# Patient Record
Sex: FEMALE | Race: WHITE | URBAN - METROPOLITAN AREA
[De-identification: names, ages, dates, MRNs, and addresses within clinical notes are randomized per-mention and may not be internally consistent; named-entity substitution may affect disease eponyms.]

---

## 2024-07-01 ENCOUNTER — TELEPHONE (OUTPATIENT)
Dept: SURGERY | Facility: CLINIC | Age: 54
End: 2024-07-01

## 2024-12-07 ENCOUNTER — APPOINTMENT (EMERGENCY)
Dept: CT IMAGING | Facility: HOSPITAL | Age: 54
End: 2024-12-07
Payer: COMMERCIAL

## 2024-12-07 ENCOUNTER — HOSPITAL ENCOUNTER (EMERGENCY)
Facility: HOSPITAL | Age: 54
End: 2024-12-08
Attending: EMERGENCY MEDICINE
Payer: COMMERCIAL

## 2024-12-07 VITALS
SYSTOLIC BLOOD PRESSURE: 171 MMHG | WEIGHT: 293 LBS | RESPIRATION RATE: 18 BRPM | OXYGEN SATURATION: 96 % | DIASTOLIC BLOOD PRESSURE: 74 MMHG | TEMPERATURE: 98.5 F | HEART RATE: 72 BPM

## 2024-12-07 DIAGNOSIS — M54.50 ACUTE LOW BACK PAIN: Primary | ICD-10-CM

## 2024-12-07 DIAGNOSIS — M48.061 SPINAL STENOSIS AT L4-L5 LEVEL: ICD-10-CM

## 2024-12-07 PROCEDURE — 99285 EMERGENCY DEPT VISIT HI MDM: CPT | Performed by: EMERGENCY MEDICINE

## 2024-12-07 PROCEDURE — 96372 THER/PROPH/DIAG INJ SC/IM: CPT

## 2024-12-07 PROCEDURE — 72131 CT LUMBAR SPINE W/O DYE: CPT

## 2024-12-07 PROCEDURE — 96374 THER/PROPH/DIAG INJ IV PUSH: CPT

## 2024-12-07 PROCEDURE — 96376 TX/PRO/DX INJ SAME DRUG ADON: CPT

## 2024-12-07 PROCEDURE — 99284 EMERGENCY DEPT VISIT MOD MDM: CPT

## 2024-12-07 RX ORDER — LIDOCAINE 50 MG/G
1 PATCH TOPICAL ONCE
Status: DISCONTINUED | OUTPATIENT
Start: 2024-12-07 | End: 2024-12-08 | Stop reason: HOSPADM

## 2024-12-07 RX ORDER — CYCLOBENZAPRINE HCL 10 MG
10 TABLET ORAL ONCE
Status: COMPLETED | OUTPATIENT
Start: 2024-12-07 | End: 2024-12-07

## 2024-12-07 RX ORDER — FENTANYL CITRATE 50 UG/ML
50 INJECTION, SOLUTION INTRAMUSCULAR; INTRAVENOUS ONCE
Refills: 0 | Status: COMPLETED | OUTPATIENT
Start: 2024-12-07 | End: 2024-12-07

## 2024-12-07 RX ORDER — KETOROLAC TROMETHAMINE 30 MG/ML
30 INJECTION, SOLUTION INTRAMUSCULAR; INTRAVENOUS ONCE
Status: COMPLETED | OUTPATIENT
Start: 2024-12-07 | End: 2024-12-07

## 2024-12-07 RX ORDER — ACETAMINOPHEN 325 MG/1
975 TABLET ORAL ONCE
Status: COMPLETED | OUTPATIENT
Start: 2024-12-07 | End: 2024-12-07

## 2024-12-07 RX ORDER — IBUPROFEN 600 MG/1
600 TABLET, FILM COATED ORAL ONCE
Status: DISCONTINUED | OUTPATIENT
Start: 2024-12-07 | End: 2024-12-08 | Stop reason: HOSPADM

## 2024-12-07 RX ADMIN — FENTANYL CITRATE 50 MCG: 50 INJECTION INTRAMUSCULAR; INTRAVENOUS at 20:17

## 2024-12-07 RX ADMIN — FENTANYL CITRATE 50 MCG: 50 INJECTION INTRAMUSCULAR; INTRAVENOUS at 23:52

## 2024-12-07 RX ADMIN — KETOROLAC TROMETHAMINE 30 MG: 30 INJECTION, SOLUTION INTRAMUSCULAR at 18:31

## 2024-12-07 RX ADMIN — LIDOCAINE 1 PATCH: 700 PATCH TOPICAL at 18:05

## 2024-12-07 RX ADMIN — ACETAMINOPHEN 975 MG: 325 TABLET, FILM COATED ORAL at 18:31

## 2024-12-07 RX ADMIN — CYCLOBENZAPRINE 10 MG: 10 TABLET, FILM COATED ORAL at 18:32

## 2024-12-07 NOTE — ED NOTES
"Patient offered wheelchair, but refused stating \"I'll still be rocking and rolling no matter what\".      Krish Willis RN  12/07/24 2723    "

## 2024-12-08 ENCOUNTER — APPOINTMENT (EMERGENCY)
Dept: RADIOLOGY | Facility: HOSPITAL | Age: 54
End: 2024-12-08
Payer: COMMERCIAL

## 2024-12-08 ENCOUNTER — HOSPITAL ENCOUNTER (OUTPATIENT)
Facility: HOSPITAL | Age: 54
Setting detail: OBSERVATION
Discharge: HOME | End: 2024-12-09
Attending: EMERGENCY MEDICINE | Admitting: STUDENT IN AN ORGANIZED HEALTH CARE EDUCATION/TRAINING PROGRAM
Payer: COMMERCIAL

## 2024-12-08 DIAGNOSIS — K21.9 GERD (GASTROESOPHAGEAL REFLUX DISEASE): ICD-10-CM

## 2024-12-08 DIAGNOSIS — M48.062 SPINAL STENOSIS OF LUMBAR REGION WITH NEUROGENIC CLAUDICATION: ICD-10-CM

## 2024-12-08 DIAGNOSIS — M54.16 LUMBAR RADICULOPATHY: ICD-10-CM

## 2024-12-08 DIAGNOSIS — M54.50 LOW BACK PAIN: Primary | ICD-10-CM

## 2024-12-08 PROBLEM — M50.20 CERVICAL DISC HERNIATION: Status: RESOLVED | Noted: 2024-12-08 | Resolved: 2024-12-08

## 2024-12-08 PROBLEM — M48.061 LUMBAR CANAL STENOSIS: Status: ACTIVE | Noted: 2024-12-08

## 2024-12-08 PROBLEM — M50.20 CERVICAL DISC HERNIATION: Status: ACTIVE | Noted: 2024-12-08

## 2024-12-08 PROBLEM — M48.061 LUMBAR CANAL STENOSIS: Status: RESOLVED | Noted: 2024-12-08 | Resolved: 2024-12-08

## 2024-12-08 LAB
ALBUMIN SERPL BCG-MCNC: 3.7 G/DL (ref 3.5–5)
ALP SERPL-CCNC: 43 U/L (ref 34–104)
ALT SERPL W P-5'-P-CCNC: 16 U/L (ref 7–52)
ANION GAP SERPL CALCULATED.3IONS-SCNC: 6 MMOL/L (ref 4–13)
AST SERPL W P-5'-P-CCNC: 15 U/L (ref 13–39)
BASOPHILS # BLD AUTO: 0.05 THOUSANDS/ÂΜL (ref 0–0.1)
BASOPHILS NFR BLD AUTO: 1 % (ref 0–1)
BILIRUB SERPL-MCNC: 0.49 MG/DL (ref 0.2–1)
BUN SERPL-MCNC: 18 MG/DL (ref 5–25)
CALCIUM SERPL-MCNC: 8.5 MG/DL (ref 8.4–10.2)
CHLORIDE SERPL-SCNC: 108 MMOL/L (ref 96–108)
CO2 SERPL-SCNC: 25 MMOL/L (ref 21–32)
CREAT SERPL-MCNC: 0.69 MG/DL (ref 0.6–1.3)
EOSINOPHIL # BLD AUTO: 0.27 THOUSAND/ÂΜL (ref 0–0.61)
EOSINOPHIL NFR BLD AUTO: 4 % (ref 0–6)
ERYTHROCYTE [DISTWIDTH] IN BLOOD BY AUTOMATED COUNT: 13.7 % (ref 11.6–15.1)
GFR SERPL CREATININE-BSD FRML MDRD: 98 ML/MIN/1.73SQ M
GLUCOSE SERPL-MCNC: 135 MG/DL (ref 65–140)
HCT VFR BLD AUTO: 43.9 % (ref 34.8–46.1)
HGB BLD-MCNC: 13.9 G/DL (ref 11.5–15.4)
IMM GRANULOCYTES # BLD AUTO: 0.02 THOUSAND/UL (ref 0–0.2)
IMM GRANULOCYTES NFR BLD AUTO: 0 % (ref 0–2)
LYMPHOCYTES # BLD AUTO: 2.45 THOUSANDS/ÂΜL (ref 0.6–4.47)
LYMPHOCYTES NFR BLD AUTO: 35 % (ref 14–44)
MCH RBC QN AUTO: 30.8 PG (ref 26.8–34.3)
MCHC RBC AUTO-ENTMCNC: 31.7 G/DL (ref 31.4–37.4)
MCV RBC AUTO: 97 FL (ref 82–98)
MONOCYTES # BLD AUTO: 0.57 THOUSAND/ÂΜL (ref 0.17–1.22)
MONOCYTES NFR BLD AUTO: 8 % (ref 4–12)
NEUTROPHILS # BLD AUTO: 3.69 THOUSANDS/ÂΜL (ref 1.85–7.62)
NEUTS SEG NFR BLD AUTO: 52 % (ref 43–75)
NRBC BLD AUTO-RTO: 0 /100 WBCS
PLATELET # BLD AUTO: 225 THOUSANDS/UL (ref 149–390)
PMV BLD AUTO: 11.5 FL (ref 8.9–12.7)
POTASSIUM SERPL-SCNC: 4.2 MMOL/L (ref 3.5–5.3)
PROT SERPL-MCNC: 6.7 G/DL (ref 6.4–8.4)
RBC # BLD AUTO: 4.52 MILLION/UL (ref 3.81–5.12)
SODIUM SERPL-SCNC: 139 MMOL/L (ref 135–147)
WBC # BLD AUTO: 7.05 THOUSAND/UL (ref 4.31–10.16)

## 2024-12-08 PROCEDURE — 99285 EMERGENCY DEPT VISIT HI MDM: CPT | Performed by: EMERGENCY MEDICINE

## 2024-12-08 PROCEDURE — 99222 1ST HOSP IP/OBS MODERATE 55: CPT | Performed by: STUDENT IN AN ORGANIZED HEALTH CARE EDUCATION/TRAINING PROGRAM

## 2024-12-08 PROCEDURE — 96374 THER/PROPH/DIAG INJ IV PUSH: CPT

## 2024-12-08 PROCEDURE — 80053 COMPREHEN METABOLIC PANEL: CPT

## 2024-12-08 PROCEDURE — 96375 TX/PRO/DX INJ NEW DRUG ADDON: CPT

## 2024-12-08 PROCEDURE — 72148 MRI LUMBAR SPINE W/O DYE: CPT

## 2024-12-08 PROCEDURE — 99205 OFFICE O/P NEW HI 60 MIN: CPT | Performed by: PHYSICIAN ASSISTANT

## 2024-12-08 PROCEDURE — 99284 EMERGENCY DEPT VISIT MOD MDM: CPT

## 2024-12-08 PROCEDURE — 85025 COMPLETE CBC W/AUTO DIFF WBC: CPT

## 2024-12-08 RX ORDER — METHYLPREDNISOLONE 4 MG/1
24 TABLET ORAL DAILY
Status: COMPLETED | OUTPATIENT
Start: 2024-12-08 | End: 2024-12-08

## 2024-12-08 RX ORDER — LIDOCAINE 50 MG/G
1 PATCH TOPICAL DAILY
Status: DISCONTINUED | OUTPATIENT
Start: 2024-12-08 | End: 2024-12-09 | Stop reason: HOSPADM

## 2024-12-08 RX ORDER — GABAPENTIN 300 MG/1
300 CAPSULE ORAL
Status: DISCONTINUED | OUTPATIENT
Start: 2024-12-08 | End: 2024-12-09

## 2024-12-08 RX ORDER — METHYLPREDNISOLONE 4 MG/1
8 TABLET ORAL DAILY
Status: DISCONTINUED | OUTPATIENT
Start: 2024-12-12 | End: 2024-12-09 | Stop reason: HOSPADM

## 2024-12-08 RX ORDER — METHOCARBAMOL 750 MG/1
750 TABLET, FILM COATED ORAL EVERY 6 HOURS SCHEDULED
Status: DISCONTINUED | OUTPATIENT
Start: 2024-12-08 | End: 2024-12-09 | Stop reason: HOSPADM

## 2024-12-08 RX ORDER — LIDOCAINE 50 MG/G
1 PATCH TOPICAL DAILY
Status: DISCONTINUED | OUTPATIENT
Start: 2024-12-09 | End: 2024-12-08

## 2024-12-08 RX ORDER — FENTANYL CITRATE 50 UG/ML
50 INJECTION, SOLUTION INTRAMUSCULAR; INTRAVENOUS ONCE
Refills: 0 | Status: COMPLETED | OUTPATIENT
Start: 2024-12-08 | End: 2024-12-08

## 2024-12-08 RX ORDER — METHYLPREDNISOLONE 4 MG/1
4 TABLET ORAL DAILY
Status: DISCONTINUED | OUTPATIENT
Start: 2024-12-13 | End: 2024-12-09 | Stop reason: HOSPADM

## 2024-12-08 RX ORDER — METHYLPREDNISOLONE 4 MG/1
12 TABLET ORAL DAILY
Status: DISCONTINUED | OUTPATIENT
Start: 2024-12-11 | End: 2024-12-09 | Stop reason: HOSPADM

## 2024-12-08 RX ORDER — ACETAMINOPHEN 325 MG/1
975 TABLET ORAL EVERY 8 HOURS SCHEDULED
Status: DISCONTINUED | OUTPATIENT
Start: 2024-12-08 | End: 2024-12-09 | Stop reason: HOSPADM

## 2024-12-08 RX ORDER — METHYLPREDNISOLONE 4 MG/1
16 TABLET ORAL DAILY
Status: DISCONTINUED | OUTPATIENT
Start: 2024-12-10 | End: 2024-12-09 | Stop reason: HOSPADM

## 2024-12-08 RX ORDER — ACETAMINOPHEN 325 MG/1
650 TABLET ORAL EVERY 6 HOURS PRN
Status: DISCONTINUED | OUTPATIENT
Start: 2024-12-08 | End: 2024-12-08

## 2024-12-08 RX ORDER — OXYCODONE HYDROCHLORIDE 5 MG/1
5 TABLET ORAL EVERY 6 HOURS PRN
Status: DISCONTINUED | OUTPATIENT
Start: 2024-12-08 | End: 2024-12-09 | Stop reason: HOSPADM

## 2024-12-08 RX ORDER — METHYLPREDNISOLONE 4 MG/1
20 TABLET ORAL DAILY
Status: COMPLETED | OUTPATIENT
Start: 2024-12-09 | End: 2024-12-09

## 2024-12-08 RX ORDER — HYDROMORPHONE HCL/PF 1 MG/ML
0.5 SYRINGE (ML) INJECTION ONCE
Refills: 0 | Status: COMPLETED | OUTPATIENT
Start: 2024-12-08 | End: 2024-12-08

## 2024-12-08 RX ORDER — HYDROMORPHONE HCL IN WATER/PF 6 MG/30 ML
0.2 PATIENT CONTROLLED ANALGESIA SYRINGE INTRAVENOUS EVERY 6 HOURS PRN
Status: DISCONTINUED | OUTPATIENT
Start: 2024-12-08 | End: 2024-12-09 | Stop reason: HOSPADM

## 2024-12-08 RX ORDER — LORAZEPAM 2 MG/ML
1 INJECTION INTRAMUSCULAR ONCE
Status: COMPLETED | OUTPATIENT
Start: 2024-12-08 | End: 2024-12-08

## 2024-12-08 RX ORDER — LIDOCAINE 50 MG/G
2 PATCH TOPICAL ONCE
Status: COMPLETED | OUTPATIENT
Start: 2024-12-08 | End: 2024-12-08

## 2024-12-08 RX ADMIN — OXYCODONE HYDROCHLORIDE 5 MG: 5 TABLET ORAL at 06:21

## 2024-12-08 RX ADMIN — LIDOCAINE 2 PATCH: 50 PATCH CUTANEOUS at 09:29

## 2024-12-08 RX ADMIN — METHYLPREDNISOLONE 24 MG: 4 TABLET ORAL at 09:29

## 2024-12-08 RX ADMIN — LORAZEPAM 1 MG: 2 INJECTION INTRAMUSCULAR; INTRAVENOUS at 01:55

## 2024-12-08 RX ADMIN — ACETAMINOPHEN 975 MG: 325 TABLET, FILM COATED ORAL at 21:29

## 2024-12-08 RX ADMIN — OXYCODONE HYDROCHLORIDE 5 MG: 5 TABLET ORAL at 20:16

## 2024-12-08 RX ADMIN — METHOCARBAMOL 750 MG: 750 TABLET ORAL at 18:00

## 2024-12-08 RX ADMIN — ACETAMINOPHEN 975 MG: 325 TABLET, FILM COATED ORAL at 14:13

## 2024-12-08 RX ADMIN — HYDROMORPHONE HYDROCHLORIDE 0.2 MG: 0.2 INJECTION, SOLUTION INTRAMUSCULAR; INTRAVENOUS; SUBCUTANEOUS at 23:53

## 2024-12-08 RX ADMIN — METHOCARBAMOL 750 MG: 750 TABLET ORAL at 23:33

## 2024-12-08 RX ADMIN — OXYCODONE HYDROCHLORIDE 5 MG: 5 TABLET ORAL at 12:20

## 2024-12-08 RX ADMIN — LIDOCAINE 5% 1 PATCH: 700 PATCH TOPICAL at 23:53

## 2024-12-08 RX ADMIN — FENTANYL CITRATE 50 MCG: 50 INJECTION INTRAMUSCULAR; INTRAVENOUS at 02:59

## 2024-12-08 RX ADMIN — HYDROMORPHONE HYDROCHLORIDE 0.2 MG: 0.2 INJECTION, SOLUTION INTRAMUSCULAR; INTRAVENOUS; SUBCUTANEOUS at 17:54

## 2024-12-08 RX ADMIN — HYDROMORPHONE HYDROCHLORIDE 0.2 MG: 0.2 INJECTION, SOLUTION INTRAMUSCULAR; INTRAVENOUS; SUBCUTANEOUS at 09:30

## 2024-12-08 RX ADMIN — ACETAMINOPHEN 975 MG: 325 TABLET, FILM COATED ORAL at 09:28

## 2024-12-08 RX ADMIN — HYDROMORPHONE HYDROCHLORIDE 0.5 MG: 1 INJECTION, SOLUTION INTRAMUSCULAR; INTRAVENOUS; SUBCUTANEOUS at 03:48

## 2024-12-08 RX ADMIN — METHOCARBAMOL 750 MG: 750 TABLET ORAL at 12:20

## 2024-12-08 RX ADMIN — GABAPENTIN 300 MG: 300 CAPSULE ORAL at 21:29

## 2024-12-08 NOTE — ED PROVIDER NOTES
Time reflects when diagnosis was documented in both MDM as applicable and the Disposition within this note       Time User Action Codes Description Comment    12/8/2024  2:46 AM Sherwin Guerra Add [M54.9] Back pain     12/8/2024  2:46 AM Sherwin Guerra [M54.50] Acute low back pain     12/8/2024  2:46 AM [email or web address] Modify [M54.9] Back pain     12/8/2024  2:46 AM Sherwin Guerra Modify [M54.50] Acute low back pain     12/8/2024  2:46 AM [email or web address] navas Remove [M54.9] Back pain     12/8/2024  2:46 AM Sherwin Guerra [M48.061] Spinal stenosis at L4-L5 level           ED Disposition       ED Disposition   Transfer to Another Facility-In Network    Condition   --    Date/Time   Sat Dec 7, 2024  8:39 PM    Comment   Ora Veliz should be transferred out to [facility].               Assessment & Plan       Medical Decision Making  Patient is a 54-year-old female who presented to the ED for low back pain.  Patient stated that she started having back pain approximately 1 week ago in the lower lumbar midline region as well as region to the right of this.  She stated that she has a remote history of sciatica approximately 24 years ago when she was pregnant but since then has not had any back issues until last week.  States that the pain has been getting progressively worse and currently it has given her a limp due to the pain.  She endorses significant worsening of the pain over this past week.  Notably, the patient has had a fall approximately 1 month ago when she landed on her bilateral knees after mechanical fall.  She states that she did not have any back pain after this incident and that it did not start until last week.  In addition, she has noticed some pins-and-needles and tingling sensations going down the right leg.  Denies any fevers, chills, or other systemic symptoms.  No bowel or bladder dysfunction.  No saddle anesthesia.  No other complaints.  On evaluation, the patient appears in severe distress due to  the back pain.    Ddx includes but is not limited to sciatica/radiculopathy, acute low back strain/sprain, cord compression.  CT lumbar revealed severe canal stenosis of L4-L5 level.  Given this finding in addition to the patient's reduced strength on straight leg lift to the right lower extremity, neurology and neurosurgery were contacted.  Neurosurgery stated that they would like a stat MRI.  Patient was counseled on this and agreeable to transfer for MRI.  Patient was transferred successfully to [facility] for MRI and potential subsequent management.    Amount and/or Complexity of Data Reviewed  Radiology: ordered.    Risk  OTC drugs.  Prescription drug management.             Medications   cyclobenzaprine (FLEXERIL) tablet 10 mg (10 mg Oral Given 12/7/24 1832)   acetaminophen (TYLENOL) tablet 975 mg (975 mg Oral Given 12/7/24 1831)   ketorolac (TORADOL) injection 30 mg (30 mg Intramuscular Given 12/7/24 1831)   fentaNYL injection 50 mcg (50 mcg Intravenous Given 12/7/24 2017)   fentaNYL injection 50 mcg (50 mcg Intravenous Given 12/7/24 2352)       ED Risk Strat Scores                           SBIRT 22yo+      Flowsheet Row Most Recent Value   Initial Alcohol Screen: US AUDIT-C     1. How often do you have a drink containing alcohol? 0 Filed at: 12/07/2024 1704   2. How many drinks containing alcohol do you have on a typical day you are drinking?  0 Filed at: 12/07/2024 1704   3b. FEMALE Any Age, or MALE 65+: How often do you have 4 or more drinks on one occassion? 0 Filed at: 12/07/2024 1704   Audit-C Score 0 Filed at: 12/07/2024 1704   SANCHO: How many times in the past year have you...    Used an illegal drug or used a prescription medication for non-medical reasons? Never Filed at: 12/07/2024 1704                            History of Present Illness       Chief Complaint   Patient presents with    Back Pain     Patient c/o intermittent R mid back pain with numbness radiating down R leg. Hx Sciatic  Pain. Pt reports fall approximately one month ago and not sure if it's related. New pain started approximately one week ago. Attempted OTC meds without relief.        Past Medical History:   Diagnosis Date    Asthma       Past Surgical History:   Procedure Laterality Date    HERNIA REPAIR        History reviewed. No pertinent family history.   Social History     Tobacco Use    Smoking status: Never    Smokeless tobacco: Never   Vaping Use    Vaping status: Never Used   Substance Use Topics    Alcohol use: Not Currently    Drug use: Not Currently      E-Cigarette/Vaping    E-Cigarette Use Never User       E-Cigarette/Vaping Substances      I have reviewed and agree with the history as documented.     Patient is a 54-year-old female who presented to the ED for low back pain.  Patient stated that she started having back pain approximately 1 week ago in the lower lumbar midline region as well as region to the right of this.  She stated that she has a remote history of sciatica approximately 24 years ago when she was pregnant but since then has not had any back issues until last week.  States that the pain has been getting progressively worse and currently it has given her a limp due to the pain.  She endorses significant worsening of the pain over this past week.  Notably, the patient has had a fall approximately 1 month ago when she landed on her bilateral knees after mechanical fall.  She states that she did not have any back pain after this incident and that it did not start until last week.  In addition, she has noticed some pins-and-needles and tingling sensations going down the right leg.  Denies any fevers, chills, or other systemic symptoms.  No bowel or bladder dysfunction.  No saddle anesthesia.  No other complaints.  On evaluation, the patient appears in severe distress due to the back pain.        Review of Systems   Constitutional:  Negative for chills, fatigue and fever.   HENT: Negative.     Respiratory:   Negative for cough and shortness of breath.    Cardiovascular:  Negative for chest pain and palpitations.   Gastrointestinal:  Negative for abdominal pain, nausea and vomiting.   Genitourinary:  Negative for decreased urine volume, difficulty urinating, dysuria, frequency and urgency.   Musculoskeletal:  Positive for back pain. Negative for neck pain.   Skin: Negative.    Neurological:  Negative for dizziness, syncope and numbness.   Psychiatric/Behavioral: Negative.             Objective       ED Triage Vitals   Temperature Pulse Blood Pressure Respirations SpO2 Patient Position - Orthostatic VS   12/07/24 1657 12/07/24 1657 12/07/24 1657 12/07/24 1657 12/07/24 1657 12/07/24 1657   98.5 °F (36.9 °C) 69 (!) 182/82 18 94 % Sitting      Temp Source Heart Rate Source BP Location FiO2 (%) Pain Score    12/07/24 1657 12/07/24 1657 12/07/24 1657 -- 12/07/24 1806    Oral Monitor Left arm  7      Vitals      Date and Time Temp Pulse SpO2 Resp BP Pain Score FACES Pain Rating User   12/07/24 2351 -- 72 96 % 18 171/74 8 -- VA   12/07/24 2146 -- 56 99 % 18 134/63 8 -- KLB   12/07/24 2017 -- -- -- -- -- 9 -- JM   12/07/24 1830 -- -- -- -- -- 7 -- KLB   12/07/24 1806 -- -- -- -- -- 7 -- B   12/07/24 1657 98.5 °F (36.9 °C) 69 94 % 18 182/82 -- -- DG            Physical Exam  On examination:  The patient is awake, alert and oriented  HEENT: Normocephalic/atraumatic  External examination of the ears is unremarkable  Pupils are equal round and reactive to light, there is no conjunctival injection or scleral icterus noted  Nares are patent without rhinorrhea.  The oropharynx is moist without injection  The neck is supple  Lungs: Clear to auscultation bilaterally  Heart: Regular without murmurs rubs or gallops  Abdomen: Soft and nontender. There are positive bowel sounds. there is no rebound or guarding  Musculoskeletal: Midline tenderness to L4-L5 lumbar region without step offs/deformities. Tenderness to coccyx.   Neuro: Cranial  nerves II through XII grossly intact.   3/5 strength right lower extremity straight leg lift, 5/5 strength left lower extremity straight leg lift. 5/5 strength b/l for dorsiflexion and plantarflexion.   Skin: No rash noted  Psych: Mood and affect normal      Results Reviewed       None            CT spine lumbar wo contrast   Final Interpretation by Sundar Lizama MD (12/07 1918)      No acute fracture or evidence for traumatic malalignment.      Degenerative changes of the lumbar spine as described above. Multifactorial disease results in severe canal stenosis at L4-L5.      Workstation performed: [id number]             Procedures    ED Medication and Procedure Management   None     There are no discharge medications for this patient.    No discharge procedures on file.  ED SEPSIS DOCUMENTATION   Time reflects when diagnosis was documented in both MDM as applicable and the Disposition within this note       Time User Action Codes Description Comment    12/8/2024  2:46 AM Sherwin Guerra [M54.9] Back pain     12/8/2024  2:46 AM Sherwin Guerra [M54.50] Acute low back pain     12/8/2024  2:46 AM Sherwin Guerra [M54.9] Back pain     12/8/2024  2:46 AM Sherwin Guerra [M54.50] Acute low back pain     12/8/2024  2:46 AM Sherwin Guerra Remove [M54.9] Back pain     12/8/2024  2:46 AM Sherwin Guerra [M48.061] Spinal stenosis at L4-L5 level                  Sherwin Guerra MD  12/08/24 1818       Sherwin Guerra MD  12/08/24 2028

## 2024-12-08 NOTE — H&P
INTERNAL MEDICINE RESIDENCY ADMISSION H&P     Name: Ora Veliz   Age & Sex: 54 y.o. female   MRN: [id number]  Unit/Bed#: [facility] 619-01   Encounter: [id number]  Primary Care Provider: No primary care provider on file.    Code Status: Level 1 - Full Code  Admission Status: OBSERVATION  Disposition: Patient requires Med/Surg    Admit to team: SOD Team B     ASSESSMENT/PLAN     Active Problems:    Lumbar canal stenosis      Lumbar canal stenosis  Assessment & Plan  Patient presenting with worsening lumbar pain over the last week that has progressed to numbness/tingling in her right foot.  CT lumbar spine showed severe canal stenosis at L4-L5 with moderate left and mild right foraminal stenosis. Also showed moderate bilateral foraminal narrowing at L5-S1.  Neurosurgery requested admission overnight for evaluation.    Plan:  - Neurosurgery consulted, appreciate recommendations  - Follow-up MRI lumbar spine  - Oxycodone 2.5 mg / 5 mg every 6 hours as needed for moderate/severe pain  - Dilaudid 0.2 mg every 6 hours as needed for breakthrough pain  - N.p.o. while awaiting neurosurgery evaluation        VTE Pharmacologic Prophylaxis: Reason for no pharmacologic prophylaxis patient placed under observation and possible discharge pending neurosurgery evaluation  VTE Mechanical Prophylaxis: sequential compression device    CHIEF COMPLAINT     Chief Complaint   Patient presents with    Medical Problem     Pt transfer from [place] for MRI. Pt having pain that started a week ago in her lower back and then radiated down her legs. Hx of sciatic nerve.      HISTORY OF PRESENT ILLNESS     Patient is a 54-year-old female with a past medical history of fibromyalgia and asthma but has not seen doctors in years and takes no medications who presented to the hospital due to worsening lower back pain.  No recent trauma to the area but she had a fall 1 month ago.  Symptoms initially began in the beginning of the week and started with lumbar  pain.  As the week progressed, the pain began radiating down her right leg and into her right foot.  She started to develop numbness and tingling on the lateral aspect of the right lower leg into the foot, prompting evaluation.  She denies saddle anesthesia or bladder/bowel incontinence.  She feels weak in the right leg but is unsure if it is due to the pain or true weakness.  No tobacco/alcohol/IV drug use.  In the ED, vitals were notable for /82.  CT lumbar spine showed severe canal stenosis at L4-L5 with marginal osteophytosis and moderate left foraminal narrowing.  Also showed moderate bilateral foraminal narrowing at L5-S1.  She was given fentanyl 50 mcg, Dilaudid 0.5 mg, and Ativan 1 mg in the ED.  Neurosurgery was consulted and they requested admission for formal neurosurgery evaluation in the morning.  She is being placed under observation.    REVIEW OF SYSTEMS     Review of Systems   Constitutional:  Negative for chills and fever.   HENT:  Negative for ear pain and sore throat.    Eyes:  Negative for pain and visual disturbance.   Respiratory:  Negative for cough and shortness of breath.    Cardiovascular:  Negative for chest pain and palpitations.   Gastrointestinal:  Negative for abdominal pain and vomiting.   Genitourinary:  Negative for dysuria and hematuria.   Musculoskeletal:  Positive for back pain and gait problem. Negative for arthralgias.   Skin:  Negative for color change and rash.   Neurological:  Positive for weakness and numbness. Negative for seizures and syncope.   All other systems reviewed and are negative.    OBJECTIVE     Vitals:    24 0106 24 0107 24 0348   BP:  150/75 127/60   Pulse: 62  71   Resp: 15  16   Temp: 98.2 °F (36.8 °C)     TempSrc: Oral     SpO2: 98%  91%      Temperature:   Temp (24hrs), Av.4 °F (36.9 °C), Min:98.2 °F (36.8 °C), Max:98.5 °F (36.9 °C)    Temperature: 98.2 °F (36.8 °C)  Intake & Output:  I/O       None          Weights:       "  There is no height or weight on file to calculate BMI.  Weight (last 2 days)       None          Physical Exam  Vitals and nursing note reviewed.   Constitutional:       General: She is not in acute distress.     Appearance: She is well-developed. She is obese.   HENT:      Head: Normocephalic and atraumatic.   Eyes:      Conjunctiva/sclera: Conjunctivae normal.   Cardiovascular:      Rate and Rhythm: Normal rate and regular rhythm.      Heart sounds: No murmur heard.  Pulmonary:      Effort: Pulmonary effort is normal. No respiratory distress.      Breath sounds: Normal breath sounds.   Abdominal:      Palpations: Abdomen is soft.      Tenderness: There is no abdominal tenderness.   Musculoskeletal:         General: No swelling.      Cervical back: Neck supple.        Legs:       Comments: Numbness along the L5 dermatome as shown above.  Unable to perform straight leg test secondary to pain.  RLE muscle strength 4/5 in hip flexion, knee flexion, and dorsiflexion but mostly due to pain.  Left lower extremity 5/5.  No saddle anesthesia   Skin:     General: Skin is warm and dry.      Capillary Refill: Capillary refill takes less than 2 seconds.   Neurological:      Mental Status: She is alert and oriented to person, place, and time.   Psychiatric:         Mood and Affect: Mood normal.       PAST MEDICAL HISTORY     Past Medical History:   Diagnosis Date    Asthma      PAST SURGICAL HISTORY   No past surgical history on file.  SOCIAL & FAMILY HISTORY     Social History     Substance and Sexual Activity   Alcohol Use Not Currently       Social History     Substance and Sexual Activity   Drug Use Not Currently     Social History     Tobacco Use   Smoking Status Never   Smokeless Tobacco Never     No family history on file.  LABORATORY DATA     Labs: I have personally reviewed pertinent reports.             Invalid input(s): \"LABALBU\"                       Micro:  No results found for: \"BLOODCX\", \"URINECX\", \"WOUNDCULT\", " "\"SPUTUMCULTUR\"  IMAGING & DIAGNOSTIC TESTS     Imaging: I have personally reviewed pertinent reports.    No results found.  EKG, Pathology, and Other Studies: I have personally reviewed pertinent reports.     ALLERGIES   No Known Allergies  MEDICATIONS PRIOR TO ARRIVAL     Prior to Admission medications    Not on File     MEDICATIONS ADMINISTERED IN LAST 24 HOURS     Medication Administration - last 24 hours from 12/07/2024 0535 to 12/08/2024 0535         Date/Time Order Dose Route Action Action by     12/08/2024 0155 EST LORazepam (ATIVAN) injection 1 mg 1 mg Intravenous Given Quyen Ashley RN     12/08/2024 0259 EST fentaNYL injection 50 mcg 50 mcg Intravenous Given Quyen Ashley RN     12/08/2024 0348 EST HYDROmorphone (DILAUDID) injection 0.5 mg 0.5 mg Intravenous Given Quyen Ashley RN          CURRENT MEDICATIONS     Current Facility-Administered Medications   Medication Dose Route Frequency Provider Last Rate    acetaminophen  650 mg Oral Q6H PRN Jameal Hadeed, DO      HYDROmorphone  0.2 mg Intravenous Q6H PRN Jameal Hadeed, DO      oxyCODONE  2.5 mg Oral Q6H PRN Jameal Hadeed, DO      Or    oxyCODONE  5 mg Oral Q6H PRN Jameal Hadeed, DO          acetaminophen, 650 mg, Q6H PRN  HYDROmorphone, 0.2 mg, Q6H PRN  oxyCODONE, 2.5 mg, Q6H PRN   Or  oxyCODONE, 5 mg, Q6H PRN        Admission Time  I spent 30 minutes admitting the patient.  This involved direct patient contact where I performed a full history and physical, reviewing previous records, and reviewing laboratory and other diagnostic studies.    Portions of the record may have been created with voice recognition software.  Occasional wrong word or \"sound a like\" substitutions may have occurred due to the inherent limitations of voice recognition software.  Read the chart carefully and recognize, using context, where substitutions have occurred.    ==  Amanda Lares DO  [facility]  Internal Medicine Residency PGY-2   "

## 2024-12-08 NOTE — OCCUPATIONAL THERAPY NOTE
Occupational Therapy         Patient Name: Ora Veliz  Today's Date: 12/8/2024 12/08/24 1100   OT Last Visit   OT Visit Date 12/08/24   Note Type   Note type Cancelled Session   Cancel Reasons Other   Additional Comments Per NS pt is currently on bedrest with [facility] - will defer until pt is cleared to particpate more in therapy       Tamia Adhikari

## 2024-12-08 NOTE — ED PROVIDER NOTES
Time reflects when diagnosis was documented in both MDM as applicable and the Disposition within this note       Time User Action Codes Description Comment    12/8/2024  4:20 AM Bobby Dumont Add [M54.50] Low back pain           ED Disposition       ED Disposition   Admit    Condition   Stable    Date/Time   Sun Dec 8, 2024  4:47 AM    Comment   Case was discussed with SOD and the patient's admission status was agreed to be Admission Status: observation status to the service of Dr. Torres .               Assessment & Plan       Medical Decision Making  Ordering MRIs requested.  Patient's pain is currently managed.  Continue to manage patient's pain.  At this time I do not see any concerns for cauda equina such as saddle anesthesia, perianal numbness, urinary retention.  After the MRI came back and due to the spinal stenosis and neural foramen narrowing consulted neurosurgery.  Neurosurgery recommended that the patient be admitted for pain control as well as they will see her in the morning for further workup and potential options will be presented to the patient of how to manage her acute low back pain.    Amount and/or Complexity of Data Reviewed  Radiology: ordered.    Risk  Prescription drug management.  Decision regarding hospitalization.             Medications   LORazepam (ATIVAN) injection 1 mg (1 mg Intravenous Given 12/8/24 0155)       ED Risk Strat Scores                                               History of Present Illness       Chief Complaint   Patient presents with    Medical Problem     Pt transfer from [place] for MRI. Pt having pain that started a week ago in her lower back and then radiated down her legs. Hx of sciatic nerve.       Past Medical History:   Diagnosis Date    Asthma       No past surgical history on file.   No family history on file.   Social History     Tobacco Use    Smoking status: Never    Smokeless tobacco: Never   Vaping Use    Vaping status: Never Used   Substance Use  Topics    Alcohol use: Not Currently    Drug use: Not Currently      E-Cigarette/Vaping    E-Cigarette Use Never User       E-Cigarette/Vaping Substances      I have reviewed and agree with the history as documented.     Patient is a 54-year-old female who was sent in from [place] for a stat MRI of the L-spine.  Patient states that for the past week she has had right lower back pain radiating down her lateral thigh and crossing over about the knee and into the mid foot.  Denies any bowel bladder dysfunction, headaches, fevers, history of IV drug abuse, inability to walk.  She does endorse numbness and tingling are all along that L4-L5 radicular nerve distribution.  Neurosurgery is already been consulted.  Patient had a CT scan of the L-spine done at [facility] which showed severe foraminal stenosis possibly due to a bony osteophyte.      Medical Problem  Associated symptoms: no abdominal pain, no chest pain, no cough, no fever, no nausea, no rash, no shortness of breath and no vomiting        Review of Systems   Constitutional:  Negative for fever.   Respiratory:  Negative for cough and shortness of breath.    Cardiovascular:  Negative for chest pain and palpitations.   Gastrointestinal:  Negative for abdominal pain, nausea and vomiting.   Genitourinary:  Negative for dysuria and hematuria.   Musculoskeletal:  Positive for back pain.   Skin:  Negative for rash.   Neurological:  Negative for syncope.   All other systems reviewed and are negative.          Objective       ED Triage Vitals   Temperature Pulse Blood Pressure Respirations SpO2 Patient Position - Orthostatic VS   12/08/24 0106 12/08/24 0106 12/08/24 0107 12/08/24 0106 12/08/24 0106 --   98.2 °F (36.8 °C) 62 150/75 15 98 %       Temp Source Heart Rate Source BP Location FiO2 (%) Pain Score    12/08/24 0106 12/08/24 0106 -- -- 12/08/24 0259    Oral Monitor   7      Vitals      Date and Time Temp Pulse SpO2 Resp BP Pain Score FACES Pain Rating User   12/08/24  0432 -- -- -- -- -- 5 -- OB   12/08/24 0348 -- 71 91 % 16 127/60 -- -- OB   12/08/24 0341 -- -- -- -- -- 7 -- OB   12/08/24 0259 -- -- -- -- -- 7 -- OB   12/08/24 0107 -- -- -- -- 150/75 -- -- MB   12/08/24 0106 98.2 °F (36.8 °C) 62 98 % 15 -- -- -- MB            Physical Exam  Vitals and nursing note reviewed.   Constitutional:       General: She is not in acute distress.     Appearance: She is well-developed.   HENT:      Head: Normocephalic and atraumatic.   Cardiovascular:      Rate and Rhythm: Normal rate and regular rhythm.   Pulmonary:      Effort: Pulmonary effort is normal. No respiratory distress.      Breath sounds: Normal breath sounds.   Abdominal:      Palpations: Abdomen is soft.      Tenderness: There is no abdominal tenderness.   Musculoskeletal:         General: No swelling.      Cervical back: Neck supple.   Skin:     Capillary Refill: Capillary refill takes less than 2 seconds.   Neurological:      Mental Status: She is alert.   Psychiatric:         Mood and Affect: Mood normal.         Results Reviewed       None            MRI lumbar spine wo contrast    (Results Pending)       Procedures    ED Medication and Procedure Management   None     Patient's Medications    No medications on file     No discharge procedures on file.  ED SEPSIS DOCUMENTATION   Time reflects when diagnosis was documented in both MDM as applicable and the Disposition within this note       Time User Action Codes Description Comment    12/8/2024  4:20 AM Bobby Dumont Add [M54.50] Low back pain                  Bobby Dumont,   12/08/24 0451

## 2024-12-08 NOTE — CONSULTS
Consultation - Neurosurgery   Name: Ora Veliz 54 y.o. female I MRN: [id number]  Unit/Bed#: [facility] 619-01 I Date of Admission: 12/8/2024   Date of Service: 12/8/2024 I Hospital Day: 0   Inpatient consult to Neurosurgery  Consult performed by: Richelle Sharpe PA-C  Consult ordered by: Aureliano Kelley DO        Physician Requesting Evaluation: Kalpana Torres DO   Reason for Evaluation / Principal Problem: RLE radiculopathy    Assessment & Plan  Lumbar radiculopathy  Patient presents with several day history of right sided back pain and right lateral leg radiculopathy  Symptoms started several days ago without inciting event or injury  No history of back surgery or chronic pain  Patient denies urinary retention, bowel incontinence, saddle anesthesia  On exam, 4+/5 RHF, RPF.  Right lateral thigh and calf numbness, right lateral foot paresthesias.    Imaging:  MRI lumbar spine w/o, 12/8/24: Inferiorly extruded disc herniation at L5-S1 on the right compressing and displacing the S1 nerve root posteriorly and laterally. Correlate for right S1 radiculopathy. There is also mild right foraminal narrowing with disc material abutting the undersurface of the L5 nerve.    Plan:  Continue to monitor neurological exam  MRI personally reviewed with the patient and her , showing a large disc herniation at right L5-S1 likely causing her symptoms  maximize multimodal pain regimen  Tylenol 975 mg Q8H  Robaxin 750mg Q6H   Gabapentin 300mg QHS  Lidocaine patches  Medrol dose pack  Aqua K  Oxycodone 2.5/5mg Q4H PRN  IV dilaudid 0.2mg Q4H PRN  Bed rest today with bathroom privileges   PT/OT evaluation tomorrow  Medical management per primary team  DVT ppx: SCDs, ok for pharm ppx  No neurosurgical intervention is anticipated at this time.  Recommend maximizing conservative management and trialing outpatient PT once pain is controlled enough for discharge.  If she fails conservative management we can discuss surgical intervention  at that time.  However, given her BMI her risks of infection and intraoperative complications will be elevated.  She is agreeable to conservative management and would like to avoid surgery if possible.    Neurosurgery will reevaluate patient tomorrow.  Please call questions or concerns.      History of Present Illness   HPI: Ora Veliz is a 54 y.o. year old female with PMH significant for obesity who presents with acute onset right-sided back pain and right lower extremity radiculopathy.  She states that this started several days ago without injury or event; she woke up with the pain.  She tried to treat at home but yesterday she developed numbness in her toes and she went to the ED for evaluation.  CT was obtained at [facility] which showed severe central stenosis at L4-5.  MRI was recommended and she was transferred to [facility] as MRI was not available at that time at [place].  MRI here showed a large right L5-S1 disc herniation.  She also has anterolisthesis at L4-5 causing moderate central stenosis.  Currently, she is complaining of right sided back pain which radiates to her hip and upper thigh.  She is also complaining of numbness, tingling, and pain radiating down her lateral leg to her entire foot.  She states that the pain is worse in her right big toe.  She denies urinary incontinence or retention.  She denies saddle anesthesia.  She denies bowel incontinence.  She does feel that her right leg is weaker but feels that this may be pain limiting.    Review of Systems   Constitutional:  Negative for chills and fever.   HENT:  Negative for ear pain and sore throat.    Eyes:  Negative for pain and visual disturbance.   Respiratory:  Negative for cough and shortness of breath.    Cardiovascular:  Negative for chest pain and palpitations.   Gastrointestinal:  Negative for abdominal pain and vomiting.   Genitourinary:  Negative for dysuria and hematuria.   Musculoskeletal:  Positive for  back pain and gait problem. Negative for arthralgias.   Skin:  Negative for color change and rash.   Neurological:  Positive for weakness and numbness. Negative for seizures and syncope.   All other systems reviewed and are negative.    I have reviewed the patient's PMH, PSH, Social History, Family History, Meds, and Allergies  Historical Information   Past Medical History:   Diagnosis Date    Asthma      Past Surgical History:   Procedure Laterality Date    HERNIA REPAIR       Social History     Tobacco Use    Smoking status: Never    Smokeless tobacco: Never   Vaping Use    Vaping status: Never Used   Substance and Sexual Activity    Alcohol use: Not Currently    Drug use: Not Currently    Sexual activity: Not on file     E-Cigarette/Vaping    E-Cigarette Use Never User      E-Cigarette/Vaping Substances     No family history on file.  Social History     Tobacco Use    Smoking status: Never    Smokeless tobacco: Never   Vaping Use    Vaping status: Never Used   Substance and Sexual Activity    Alcohol use: Not Currently    Drug use: Not Currently    Sexual activity: Not on file     None     Pollen extract    Objective :  Temp:  [98.2 °F (36.8 °C)-98.5 °F (36.9 °C)] 98.3 °F (36.8 °C)  HR:  [56-72] 66  BP: (127-182)/(60-82) 142/76  Resp:  [15-18] 16  SpO2:  [91 %-99 %] 94 %  O2 Device: None (Room air)    Physical Exam  Vitals and nursing note reviewed.   Constitutional:       General: She is not in acute distress.     Appearance: Normal appearance. She is well-developed. She is obese.   HENT:      Head: Normocephalic and atraumatic.   Eyes:      General: Lids are normal.      Extraocular Movements: Extraocular movements intact.      Conjunctiva/sclera: Conjunctivae normal.      Pupils: Pupils are equal, round, and reactive to light.   Cardiovascular:      Rate and Rhythm: Normal rate.      Heart sounds: No murmur heard.  Pulmonary:      Effort: Pulmonary effort is normal. No respiratory distress.   Abdominal:       "Palpations: Abdomen is soft.      Tenderness: There is no abdominal tenderness.   Musculoskeletal:         General: No swelling.      Cervical back: Neck supple.   Skin:     General: Skin is warm and dry.   Neurological:      Mental Status: She is alert.   Psychiatric:         Mood and Affect: Mood normal.         Speech: Speech normal.      Neurological Exam  Mental Status  Alert. Oriented to person, place and time. Recent and remote memory are intact. Speech is normal. Language is fluent with no aphasia. Attention and concentration are normal. Fund of knowledge is appropriate for level of education.    Cranial Nerves  CN II: Visual acuity is normal. Visual fields full to confrontation.  CN III, IV, VI: Extraocular movements intact bilaterally. Normal lids and orbits bilaterally. Pupils equal round and reactive to light bilaterally.  CN V: Facial sensation is normal.  CN VII: Full and symmetric facial movement.  CN VIII: Hearing is normal.  CN IX, X: Palate elevates symmetrically. Normal gag reflex.  CN XI: Shoulder shrug strength is normal.  CN XII: Tongue midline without atrophy or fasciculations.    Motor  Normal muscle bulk throughout. Normal muscle tone. Strength is 5/5 in all four extremities except as noted.  RHF 4+/5  RPF 4+/5  Otherwise 5/5 throughout.    Sensory  Decreased to LT R lateral thigh, slightly worse numbness R lateral calf  Paresthesias to LT R lateral foot.    Reflexes    Right pathological reflexes: Ankle clonus absent.  Left pathological reflexes: Ankle clonus absent.  Unable to assess patella reflexes 2/2 body habitus.        Lab Results: I have reviewed the following results:  No results for input(s): \"WBC\", \"HGB\", \"HCT\", \"PLT\", \"BANDSPCT\", \"SODIUM\", \"K\", \"CL\", \"CO2\", \"BUN\", \"CREATININE\", \"GLUC\", \"CAIONIZED\", \"MG\", \"PHOS\", \"AST\", \"ALT\", \"ALB\", \"TBILI\", \"DBILI\", \"ALKPHOS\", \"PTT\", \"INR\", \"HSTNI0\", \"HSTNI2\", \"BNP\", \"LACTICACID\" in the last 72 hours.    Imaging Results Review: I personally " reviewed the following image studies in PACS and associated radiology reports: CT abdomen/pelvis and MRI spine. My interpretation of the radiology images/reports is: as above.      VTE Pharmacologic Prophylaxis: Sequential compression device (Venodyne)

## 2024-12-08 NOTE — EMTALA/ACUTE CARE TRANSFER
[facility] EMERGENCY DEPARTMENT  [address] [address]  Dept: [phone]      EMTALA TRANSFER CONSENT    NAME Ora POSADA 1970                              MRN [id number]    I have been informed of my rights regarding examination, treatment, and transfer   by Dr. Tim Ying*    Benefits: Specialized equipment and/or services available at the receiving facility (Include comment)________________________ (STAT MRI)    Risks: Potential for delay in receiving treatment, Potential deterioration of medical condition, Loss of IV, Increased discomfort during transfer, Possible worsening of condition or death during transfer      Consent for Transfer:  I acknowledge that my medical condition has been evaluated and explained to me by the emergency department physician or other qualified medical person and/or my attending physician, who has recommended that I be transferred to the service of  Accepting Physician: Dr. Vazquez at Accepting Facility Name, [place] & [place] [facility] [place] The above potential benefits of such transfer, the potential risks associated with such transfer, and the probable risks of not being transferred have been explained to me, and I fully understand them.  The doctor has explained that, in my case, the benefits of transfer outweigh the risks.  I agree to be transferred.    I authorize the performance of emergency medical procedures and treatments upon me in both transit and upon arrival at the receiving facility.  Additionally, I authorize the release of any and all medical records to the receiving facility and request they be transported with me, if possible.  I understand that the safest mode of transportation during a medical emergency is an ambulance and that the Hospital advocates the use of this mode of transport. Risks of traveling to the receiving facility by car, including absence  of medical control, life sustaining equipment, such as oxygen, and medical personnel has been explained to me and I fully understand them.    (PATRICIA CORRECT BOX BELOW)  [  ]  I consent to the stated transfer and to be transported by ambulance/helicopter.  [  ]  I consent to the stated transfer, but refuse transportation by ambulance and accept full responsibility for my transportation by car.  I understand the risks of non-ambulance transfers and I exonerate the Hospital and its staff from any deterioration in my condition that results from this refusal.    X___________________________________________    DATE  24  TIME________  Signature of patient or legally responsible individual signing on patient behalf           RELATIONSHIP TO PATIENT_________________________          Provider Certification    NAME Ora Veliz                                         1970                              MRN [id number]    A medical screening exam was performed on the above named patient.  Based on the examination:    Condition Necessitating Transfer There were no encounter diagnoses.    Patient Condition: The patient has been stabilized such that within reasonable medical probability, no material deterioration of the patient condition or the condition of the unborn child(stephan) is likely to result from the transfer    Reason for Transfer: Level of Care needed not available at this facility    Transfer Requirements: Facility [place]   Space available and qualified personnel available for treatment as acknowledged by    Agreed to accept transfer and to provide appropriate medical treatment as acknowledged by       Dr. Vazquez  Appropriate medical records of the examination and treatment of the patient are provided at the time of transfer   STAFF INITIAL WHEN COMPLETED _______  Transfer will be performed by qualified personnel from    and appropriate transfer equipment as required, including the use  of necessary and appropriate life support measures.    Provider Certification: I have examined the patient and explained the following risks and benefits of being transferred/refusing transfer to the patient/family:  General risk, such as traffic hazards, adverse weather conditions, rough terrain or turbulence, possible failure of equipment (including vehicle or aircraft), or consequences of actions of persons outside the control of the transport personnel, Unanticipated needs of medical equipment and personnel during transport, Risk of worsening condition, The possibility of a transport vehicle being unavailable      Based on these reasonable risks and benefits to the patient and/or the unborn child(stephan), and based upon the information available at the time of the patient’s examination, I certify that the medical benefits reasonably to be expected from the provision of appropriate medical treatments at another medical facility outweigh the increasing risks, if any, to the individual’s medical condition, and in the case of labor to the unborn child, from effecting the transfer.    X____________________________________________ DATE 12/07/24        TIME_______      ORIGINAL - SEND TO MEDICAL RECORDS   COPY - SEND WITH PATIENT DURING TRANSFER

## 2024-12-08 NOTE — CASE MANAGEMENT
Case Management Assessment & Discharge Planning Note    Patient name Ora Veliz  Location [facility] MRN [id number]  : 1970 Date 2024       Current Admission Date: 2024  Current Admission Diagnosis:Lumbar radiculopathy  Patient Active Problem List    Diagnosis Date Noted Date Diagnosed    Lumbar radiculopathy 2024       LOS (days): 0  Geometric Mean LOS (GMLOS) (days):   Days to GMLOS:     OBJECTIVE:              Current admission status: Observation       Preferred Pharmacy:   [id number] - [place] - [address] [place]  [address] [place] [place] [address]  Phone: [phone] Fax: [phone]    Primary Care Provider: No primary care provider on file.    Primary Insurance: BLUE CROSS  Secondary Insurance:     ASSESSMENT:  Active Health Care Proxies       Jamal Veliz [facility] Representative - Spouse   Primary Phone: [phone] (Mobile)  Home Phone: [phone]                 Patient Information  Admitted from:: Home  Mental Status: Alert  During Assessment patient was accompanied by: Not accompanied during assessment  Assessment information provided by:: Patient  Primary Caregiver: Self  Support Systems: Spouse/significant other  County of Residence: [place]  What city do you live in?: [place]  Home entry access options. Select all that apply.: Stairs  Number of steps to enter home.: 3  Type of Current Residence: Other (Comment) (private residence)  Living Arrangements: Lives w/ Spouse/significant other  Is patient a ?: No    Activities of Daily Living Prior to Admission  Functional Status: Independent  Completes ADLs independently?: Yes  Ambulates independently?: Yes  Does patient use assisted devices?: No  Does patient currently own DME?: Yes  What DME does the patient currently own?: Straight [employer]  Does patient have a history of Outpatient Therapy (PT/OT)?: Yes (formerly [facility])  Does the patient have a history of Short-Term Rehab?:  No  Does patient have a history of HHC?: No  Does patient currently have HHC?: No    Patient Information Continued  Income Source: Unknown  Does patient have prescription coverage?: Yes  Does patient receive dialysis treatments?: No  Does patient have a history of substance abuse?: No  Does patient have a history of Mental Health Diagnosis?: No    Means of Transportation  Means of Transport to Appts:: Family transport    DISCHARGE DETAILS:    Discharge planning discussed with:: Patient  Freedom of Choice: Yes  Comments - Freedom of Choice: Discussed FOC  CM contacted family/caregiver?: No- see comments (declined)    This CM introduced self and role to patient, lives with spouse in private residence, couple KAMILAH.  IADLS, does not drive self, has cane at home (does not use at this time).  History of OP therapy, no STR, or HH noted.

## 2024-12-08 NOTE — ASSESSMENT & PLAN NOTE
Patient presents with several day history of right sided back pain and right lateral leg radiculopathy  Symptoms started several days ago without inciting event or injury  No history of back surgery or chronic pain  Patient denies urinary retention, bowel incontinence, saddle anesthesia  On exam, 4+/5 RHF, RPF.  Right lateral thigh and calf numbness, right lateral foot paresthesias.    Imaging:  MRI lumbar spine w/o, 12/8/24: Inferiorly extruded disc herniation at L5-S1 on the right compressing and displacing the S1 nerve root posteriorly and laterally. Correlate for right S1 radiculopathy. There is also mild right foraminal narrowing with disc material abutting the undersurface of the L5 nerve.    Plan:  Continue to monitor neurological exam  MRI personally reviewed with the patient and her , showing a large disc herniation at right L5-S1 likely causing her symptoms  maximize multimodal pain regimen  Tylenol 975 mg Q8H  Robaxin 750mg Q6H   Gabapentin 300mg QHS  Lidocaine patches  Medrol dose pack  Aqua K  Oxycodone 2.5/5mg Q4H PRN  IV dilaudid 0.2mg Q4H PRN  Bed rest today with bathroom privileges   PT/OT evaluation tomorrow  Medical management per primary team  DVT ppx: SCDs, ok for pharm ppx  No neurosurgical intervention is anticipated at this time.  Recommend maximizing conservative management and trialing outpatient PT once pain is controlled enough for discharge.  If she fails conservative management we can discuss surgical intervention at that time.  However, given her BMI her risks of infection and intraoperative complications will be elevated.  She is agreeable to conservative management and would like to avoid surgery if possible.    Neurosurgery will reevaluate patient tomorrow.  Please call questions or concerns.

## 2024-12-08 NOTE — ASSESSMENT & PLAN NOTE
Patient presenting with worsening lumbar pain over the last week that has progressed to numbness/tingling in her right foot.  CT lumbar spine showed severe canal stenosis at L4-L5 with moderate left and mild right foraminal stenosis. Also showed moderate bilateral foraminal narrowing at L5-S1.  Neurosurgery requested admission overnight for evaluation.  MRI showed nferiorly extruded disc herniation at L5-S1 on the right compressing and displacing the S1 nerve root posteriorly and laterally. Correlate for right S1 radiculopathy. There is also mild right foraminal narrowing with disc material abutting the undersurface of the L5 nerve. Mild annular bulging and facet arthropathy at the other lower thoracic and lumbar levels with mild canal stenosis and foraminal narrowing    Plan:  Outpatient physical therapy.  PT recommendations  Follow-up with neurosurgery outpatient  Continue gabapentin, Robaxin, acetaminophen as scheduled

## 2024-12-08 NOTE — ED NOTES
Transfer Information:   with [facility] @ 00:30  [place] CLARIB[facility] Vazquez accepting  Call report: [phone]     Mic Medina RN  12/07/24 213

## 2024-12-09 ENCOUNTER — TELEPHONE (OUTPATIENT)
Dept: NEUROSURGERY | Facility: CLINIC | Age: 54
End: 2024-12-09

## 2024-12-09 VITALS
TEMPERATURE: 97.6 F | SYSTOLIC BLOOD PRESSURE: 144 MMHG | HEART RATE: 53 BPM | RESPIRATION RATE: 16 BRPM | OXYGEN SATURATION: 91 % | DIASTOLIC BLOOD PRESSURE: 69 MMHG

## 2024-12-09 LAB
ANION GAP SERPL CALCULATED.3IONS-SCNC: 6 MMOL/L (ref 4–13)
BASOPHILS # BLD AUTO: 0.06 THOUSANDS/ÂΜL (ref 0–0.1)
BASOPHILS NFR BLD AUTO: 1 % (ref 0–1)
BUN SERPL-MCNC: 15 MG/DL (ref 5–25)
CALCIUM SERPL-MCNC: 9.3 MG/DL (ref 8.4–10.2)
CHLORIDE SERPL-SCNC: 103 MMOL/L (ref 96–108)
CO2 SERPL-SCNC: 30 MMOL/L (ref 21–32)
CREAT SERPL-MCNC: 0.66 MG/DL (ref 0.6–1.3)
EOSINOPHIL # BLD AUTO: 0.09 THOUSAND/ÂΜL (ref 0–0.61)
EOSINOPHIL NFR BLD AUTO: 1 % (ref 0–6)
ERYTHROCYTE [DISTWIDTH] IN BLOOD BY AUTOMATED COUNT: 13.5 % (ref 11.6–15.1)
GFR SERPL CREATININE-BSD FRML MDRD: 99 ML/MIN/1.73SQ M
GLUCOSE P FAST SERPL-MCNC: 107 MG/DL (ref 65–99)
GLUCOSE SERPL-MCNC: 107 MG/DL (ref 65–140)
HCT VFR BLD AUTO: 46 % (ref 34.8–46.1)
HGB BLD-MCNC: 14.3 G/DL (ref 11.5–15.4)
IMM GRANULOCYTES # BLD AUTO: 0.04 THOUSAND/UL (ref 0–0.2)
IMM GRANULOCYTES NFR BLD AUTO: 0 % (ref 0–2)
LYMPHOCYTES # BLD AUTO: 3.68 THOUSANDS/ÂΜL (ref 0.6–4.47)
LYMPHOCYTES NFR BLD AUTO: 36 % (ref 14–44)
MCH RBC QN AUTO: 29.8 PG (ref 26.8–34.3)
MCHC RBC AUTO-ENTMCNC: 31.1 G/DL (ref 31.4–37.4)
MCV RBC AUTO: 96 FL (ref 82–98)
MONOCYTES # BLD AUTO: 0.85 THOUSAND/ÂΜL (ref 0.17–1.22)
MONOCYTES NFR BLD AUTO: 8 % (ref 4–12)
NEUTROPHILS # BLD AUTO: 5.58 THOUSANDS/ÂΜL (ref 1.85–7.62)
NEUTS SEG NFR BLD AUTO: 54 % (ref 43–75)
NRBC BLD AUTO-RTO: 0 /100 WBCS
PLATELET # BLD AUTO: 261 THOUSANDS/UL (ref 149–390)
PMV BLD AUTO: 11.5 FL (ref 8.9–12.7)
POTASSIUM SERPL-SCNC: 3.9 MMOL/L (ref 3.5–5.3)
RBC # BLD AUTO: 4.8 MILLION/UL (ref 3.81–5.12)
SODIUM SERPL-SCNC: 139 MMOL/L (ref 135–147)
WBC # BLD AUTO: 10.3 THOUSAND/UL (ref 4.31–10.16)

## 2024-12-09 PROCEDURE — 99238 HOSP IP/OBS DSCHRG MGMT 30/<: CPT | Performed by: INTERNAL MEDICINE

## 2024-12-09 PROCEDURE — 80048 BASIC METABOLIC PNL TOTAL CA: CPT

## 2024-12-09 PROCEDURE — 99232 SBSQ HOSP IP/OBS MODERATE 35: CPT | Performed by: NURSE PRACTITIONER

## 2024-12-09 PROCEDURE — 97162 PT EVAL MOD COMPLEX 30 MIN: CPT

## 2024-12-09 PROCEDURE — 97166 OT EVAL MOD COMPLEX 45 MIN: CPT

## 2024-12-09 PROCEDURE — 85025 COMPLETE CBC W/AUTO DIFF WBC: CPT

## 2024-12-09 RX ORDER — PANTOPRAZOLE SODIUM 40 MG/1
40 TABLET, DELAYED RELEASE ORAL
Status: DISCONTINUED | OUTPATIENT
Start: 2024-12-09 | End: 2024-12-09 | Stop reason: HOSPADM

## 2024-12-09 RX ORDER — PANTOPRAZOLE SODIUM 40 MG/1
40 TABLET, DELAYED RELEASE ORAL
Qty: 30 TABLET | Refills: 0 | Status: SHIPPED | OUTPATIENT
Start: 2024-12-10 | End: 2024-12-19 | Stop reason: SDUPTHER

## 2024-12-09 RX ORDER — HYDROMORPHONE HCL/PF 1 MG/ML
0.5 SYRINGE (ML) INJECTION ONCE
Status: COMPLETED | OUTPATIENT
Start: 2024-12-09 | End: 2024-12-09

## 2024-12-09 RX ORDER — GABAPENTIN 300 MG/1
300 CAPSULE ORAL 2 TIMES DAILY
Status: DISCONTINUED | OUTPATIENT
Start: 2024-12-09 | End: 2024-12-09 | Stop reason: HOSPADM

## 2024-12-09 RX ORDER — METHYLPREDNISOLONE 4 MG/1
TABLET ORAL
Qty: 10 TABLET | Refills: 0 | Status: SHIPPED | OUTPATIENT
Start: 2024-12-10 | End: 2024-12-13

## 2024-12-09 RX ORDER — METHOCARBAMOL 750 MG/1
750 TABLET, FILM COATED ORAL EVERY 6 HOURS SCHEDULED
Qty: 56 TABLET | Refills: 0 | Status: SHIPPED | OUTPATIENT
Start: 2024-12-09 | End: 2024-12-19 | Stop reason: SDUPTHER

## 2024-12-09 RX ORDER — OXYCODONE HYDROCHLORIDE 5 MG/1
5 CAPSULE ORAL EVERY 6 HOURS PRN
Qty: 12 CAPSULE | Refills: 0 | Status: SHIPPED | OUTPATIENT
Start: 2024-12-09 | End: 2024-12-19

## 2024-12-09 RX ORDER — ACETAMINOPHEN 325 MG/1
975 TABLET ORAL EVERY 8 HOURS SCHEDULED
Qty: 270 TABLET | Refills: 0 | Status: SHIPPED | OUTPATIENT
Start: 2024-12-09

## 2024-12-09 RX ORDER — GABAPENTIN 300 MG/1
300 CAPSULE ORAL 2 TIMES DAILY
Qty: 60 CAPSULE | Refills: 0 | Status: SHIPPED | OUTPATIENT
Start: 2024-12-09 | End: 2024-12-19 | Stop reason: SDUPTHER

## 2024-12-09 RX ADMIN — METHYLPREDNISOLONE 20 MG: 4 TABLET ORAL at 09:25

## 2024-12-09 RX ADMIN — GABAPENTIN 300 MG: 300 CAPSULE ORAL at 09:27

## 2024-12-09 RX ADMIN — ACETAMINOPHEN 975 MG: 325 TABLET, FILM COATED ORAL at 05:25

## 2024-12-09 RX ADMIN — LIDOCAINE 5% 1 PATCH: 700 PATCH TOPICAL at 09:26

## 2024-12-09 RX ADMIN — METHOCARBAMOL 750 MG: 750 TABLET ORAL at 05:25

## 2024-12-09 RX ADMIN — HYDROMORPHONE HYDROCHLORIDE 0.2 MG: 0.2 INJECTION, SOLUTION INTRAMUSCULAR; INTRAVENOUS; SUBCUTANEOUS at 09:28

## 2024-12-09 RX ADMIN — METHOCARBAMOL 750 MG: 750 TABLET ORAL at 12:07

## 2024-12-09 RX ADMIN — HYDROMORPHONE HYDROCHLORIDE 0.5 MG: 1 INJECTION, SOLUTION INTRAMUSCULAR; INTRAVENOUS; SUBCUTANEOUS at 05:25

## 2024-12-09 RX ADMIN — OXYCODONE HYDROCHLORIDE 5 MG: 5 TABLET ORAL at 04:29

## 2024-12-09 RX ADMIN — PANTOPRAZOLE SODIUM 40 MG: 40 TABLET, DELAYED RELEASE ORAL at 09:26

## 2024-12-09 NOTE — PHYSICAL THERAPY NOTE
PHYSICAL THERAPY EVALUATION          Patient Name: Ora Veliz  Today's Date: 12/9/2024 12/09/24 0908   Pain Assessment   Pain Assessment Tool 0-10   Pain Score 6   Pain Location/Orientation Orientation: Right;Location: Leg   Pain Onset/Description Onset: Ongoing;Frequency: Constant/Continuous;Descriptor: Burning   Patient's Stated Pain Goal No pain   Hospital Pain Intervention(s) Ambulation/increased activity;Emotional support;Repositioned   Restrictions/Precautions   Weight Bearing Precautions Per Order No   Other Precautions Fall Risk;Pain   Home Living   Type of Home House   Home Layout One level;Stairs to enter with rails  (3 steps to enter)   Home Equipment Cane   Additional Comments Lives with spouse   Prior Function   Level of [place] Independent with ADLs;Independent with functional mobility   Lives With Spouse   Receives Help From Family   Falls in the last 6 months 1 to 4   Comments No AD use at baseline   Cognition   Overall Cognitive Status WFL   Orientation Level Oriented X4   Subjective   Subjective Pt found in standing in room; pt initially frustrated with her condition and the hospital, but after talking agreeable to mobility   RUE Assessment   RUE Assessment WFL   LUE Assessment   LUE Assessment WFL   RLE Assessment   RLE Assessment X   Strength RLE   R Hip Flexion 3/5   R Knee Flexion 3/5   R Knee Extension 4/5   R Ankle Dorsiflexion 4/5   R Ankle Plantar Flexion 3/5   LLE Assessment   LLE Assessment WFL   Strength LLE   L Hip Flexion 4/5   L Knee Flexion 5/5   L Knee Extension 5/5   L Ankle Dorsiflexion 5/5   L Ankle Plantar Flexion 4/5   Light Touch   RLE Light Touch Impaired   RLE Light Touch Comments Diminished along S1-2 dermatomes   LLE Light Touch Grossly intact   Transfers   Sit to Stand 5  Supervision   Stand to Sit 5  Supervision   Ambulation/Elevation   Gait pattern Decreased R  stance;Inconsistent romero;Short stride  (reached for rail against the wall occasionally)   Gait Assistance 5  Supervision   Assistive Device None   Distance 2x75'   Stair Management Assistance 5  Supervision   Stair Management Technique Two rails  (Reciprocal going up, step-to going down)   Number of Stairs 3   Balance   Static Sitting Fair +   Static Standing Fair   Ambulatory Fair -   Endurance Deficit   Endurance Deficit Yes   Endurance Deficit Description LE weakness on top of obesity   Activity Tolerance   Activity Tolerance Patient tolerated treatment well   Medical Staff Made Aware Jeffrey PT; JUDIE Graham   Nurse Made Aware Pt cleared for session per nursing   Assessment   Prognosis Good   Problem List Decreased strength;Decreased endurance;Impaired balance;Obesity;Pain;Impaired sensation   Assessment Pt is a 54 y.o. female admitted to [facility] on [facility]24 following back pain with progressive numbness/tingling/weakness down the RLE. Pt has a past medical history of Asthma. She report not seeing a doctor in several years. Pt's prior level of mobility was independent without the use of an AD. During the session, pt was is able to perform all transfers with Supervision and ambulation with Supervision without the use of an AD, however she occasionally reached for wall railing or furniture. Pt has deficits to strength and sensation in RLE primarily along S1-2 innervations, endurance secondary to weakness and obesity, and feels limited by pain. PT and pt spent time discussing how outpatient therapy can improve her symptoms and function. PT recommends discharge with level 3 resources (outpatient PT). Pt has no further acute care PT needs.   Goals   Patient Goals To go home   Plan   PT Frequency Other (Comment)  (D/C IP PT)   Discharge Recommendation   Rehab Resource Intensity Level, PT III (Minimum Resource Intensity)  (Outpatient PT)   AM-PAC Basic Mobility Inpatient   Turning in Flat Bed Without Bedrails 4   Lying on  Back to Sitting on Edge of Flat Bed Without Bedrails 4   Moving Bed to Chair 3   Standing Up From Chair Using Arms 3   Walk in Room 3   Climb 3-5 Stairs With Railing 3   Basic Mobility Inpatient Raw Score 20   Basic Mobility Standardized Score 43.99   [facility] Highest Level Of Mobility   -HL Goal 6: Walk 10 steps or more   -HLM Achieved 7: Walk 25 feet or more   End of Consult   Patient Position at End of Consult Bedside chair;All needs within reach     Paulino Vargas, SPT

## 2024-12-09 NOTE — TELEPHONE ENCOUNTER
12/9/24 - PT IN [facility]  3/10/24 3 MO HFU     DELFINA [facility] [place] [facility]  Hel,    Patient needs clinical follow up with [facility] in 3 months. Thanks.

## 2024-12-09 NOTE — PLAN OF CARE
Problem: PAIN - ADULT  Goal: Verbalizes/displays adequate comfort level or baseline comfort level  Description: Interventions:  - Encourage patient to monitor pain and request assistance  - Assess pain using appropriate pain scale  - Administer analgesics based on type and severity of pain and evaluate response  - Implement non-pharmacological measures as appropriate and evaluate response  - Consider cultural and social influences on pain and pain management  - Notify physician/advanced practitioner if interventions unsuccessful or patient reports new pain  Outcome: Progressing     Problem: INFECTION - ADULT  Goal: Absence or prevention of progression during hospitalization  Description: INTERVENTIONS:  - Assess and monitor for signs and symptoms of infection  - Monitor lab/diagnostic results  - Monitor all insertion sites, i.e. indwelling lines, tubes, and drains  - Monitor endotracheal if appropriate and nasal secretions for changes in amount and color  - [place] appropriate cooling/warming therapies per order  - Administer medications as ordered  - Instruct and encourage patient and family to use good hand hygiene technique  - Identify and instruct in appropriate isolation precautions for identified infection/condition  Outcome: Progressing  Goal: Absence of fever/infection during neutropenic period  Description: INTERVENTIONS:  - Monitor WBC    Outcome: Progressing     Problem: SAFETY ADULT  Goal: Patient will remain free of falls  Description: INTERVENTIONS:  - Educate patient/family on patient safety including physical limitations  - Instruct patient to call for assistance with activity   - Consult OT/PT to assist with strengthening/mobility   - Keep Call bell within reach  - Keep bed low and locked with side rails adjusted as appropriate  - Keep care items and personal belongings within reach  - Initiate and maintain comfort rounds  - Make Fall Risk Sign visible to staff  - Offer Toileting every 2 Hours,  in advance of need  - Initiate/Maintain alarm  - Obtain necessary fall risk management equipment:   - Apply yellow socks and bracelet for high fall risk patients  - Consider moving patient to room near nurses station  Outcome: Progressing  Goal: Maintain or return to baseline ADL function  Description: INTERVENTIONS:  -  Assess patient's ability to carry out ADLs; assess patient's baseline for ADL function and identify physical deficits which impact ability to perform ADLs (bathing, care of mouth/teeth, toileting, grooming, dressing, etc.)  - Assess/evaluate cause of self-care deficits   - Assess range of motion  - Assess patient's mobility; develop plan if impaired  - Assess patient's need for assistive devices and provide as appropriate  - Encourage maximum independence but intervene and supervise when necessary  - Involve family in performance of ADLs  - Assess for home care needs following discharge   - Consider OT consult to assist with ADL evaluation and planning for discharge  - Provide patient education as appropriate  Outcome: Progressing  Goal: Maintains/Returns to pre admission functional level  Description: INTERVENTIONS:  - Perform AM-PAC 6 Click Basic Mobility/ Daily Activity assessment daily.  - Set and communicate daily mobility goal to care team and patient/family/caregiver.   - Collaborate with rehabilitation services on mobility goals if consulted  - Perform Range of Motion 3 times a day.  - Reposition patient every 2 hours.  - Dangle patient 3 times a day  - Stand patient 3 times a day  - Ambulate patient 3 times a day  - Out of bed to chair 3 times a day   - Out of bed for meals 3 times a day  - Out of bed for toileting  - Record patient progress and toleration of activity level   Outcome: Progressing     Problem: DISCHARGE PLANNING  Goal: Discharge to home or other facility with appropriate resources  Description: INTERVENTIONS:  - Identify barriers to discharge w/patient and caregiver  -  Arrange for needed discharge resources and transportation as appropriate  - Identify discharge learning needs (meds, wound care, etc.)  - Arrange for interpretive services to assist at discharge as needed  - Refer to Case Management Department for coordinating discharge planning if the patient needs post-hospital services based on physician/advanced practitioner order or complex needs related to functional status, cognitive ability, or social support system  Outcome: Progressing     Problem: Knowledge Deficit  Goal: Patient/family/caregiver demonstrates understanding of disease process, treatment plan, medications, and discharge instructions  Description: Complete learning assessment and assess knowledge base.  Interventions:  - Provide teaching at level of understanding  - Provide teaching via preferred learning methods  Outcome: Progressing

## 2024-12-09 NOTE — DISCHARGE SUMMARY
Discharge Summary - Internal Medicine   Name: Ora Veliz 54 y.o. female I MRN: [id number]  [facility] I Date of Admission: 12/8/2024   Date of Service: 12/9/2024 I Hospital Day: 0    Assessment & Plan  Lumbar radiculopathy    Lumbar canal stenosis (Resolved: 12/8/2024)  Patient presenting with worsening lumbar pain over the last week that has progressed to numbness/tingling in her right foot.  CT lumbar spine showed severe canal stenosis at L4-L5 with moderate left and mild right foraminal stenosis. Also showed moderate bilateral foraminal narrowing at L5-S1.  Neurosurgery requested admission overnight for evaluation.  MRI showed nferiorly extruded disc herniation at L5-S1 on the right compressing and displacing the S1 nerve root posteriorly and laterally. Correlate for right S1 radiculopathy. There is also mild right foraminal narrowing with disc material abutting the undersurface of the L5 nerve. Mild annular bulging and facet arthropathy at the other lower thoracic and lumbar levels with mild canal stenosis and foraminal narrowing    Plan:  Outpatient physical therapy.  PT recommendations  Follow-up with neurosurgery outpatient  Continue gabapentin, Robaxin, acetaminophen as scheduled      Admission Date: 12/8/2024 0058  Discharge Date: 12/09/24  Admitting Diagnosis: Low back pain [M54.50]  Back pain [M54.9]  Discharge Diagnosis:   Medical Problems       Resolved Problems  Never Reviewed          Resolved    Lumbar canal stenosis 12/8/2024     Resolved by  Richelle Sharpe PA-C    Cervical disc herniation 12/8/2024     Resolved by  Richelle Sharpe PA-C          HPI: Patient is a 54-year-old female with a past medical history of fibromyalgia and asthma but has not seen doctors in years and takes no medications who presented to the hospital due to worsening lower back pain.  No recent trauma to the area but she had a fall 1 month ago.  Symptoms initially began in the beginning of the week and started  with lumbar pain.  As the week progressed, the pain began radiating down her right leg and into her right foot.  She started to develop numbness and tingling on the lateral aspect of the right lower leg into the foot, prompting evaluation.  She denies saddle anesthesia or bladder/bowel incontinence.  She feels weak in the right leg but is unsure if it is due to the pain or true weakness.  No tobacco/alcohol/IV drug use.  In the ED, vitals were notable for /82.  CT lumbar spine showed severe canal stenosis at L4-L5 with marginal osteophytosis and moderate left foraminal narrowing.  Also showed moderate bilateral foraminal narrowing at L5-S1.  She was given fentanyl 50 mcg, Dilaudid 0.5 mg, and Ativan 1 mg in the ED.  Neurosurgery was consulted and they requested admission for formal neurosurgery evaluation in the morning.  She is being placed under observation.     Procedures Performed: No orders of the defined types were placed in this encounter.      Summary of Hospital Course: Patient was seen by neurosurgery who recommended conservative management with Decadron, gabapentin, Robaxin, significant and oxycodone as needed    Significant Findings, Care, Treatment and Services Provided: Severe canal stenosis on CT scan and S1 compression on MRI    Complications: None    Discharge Day Visit / Exam:   /69   Pulse (!) 53   Temp 97.6 °F (36.4 °C)   Resp 16   SpO2 91%   Physical Exam  Constitutional:       General: She is not in acute distress.     Appearance: She is obese. She is not ill-appearing, toxic-appearing or diaphoretic.   HENT:      Head: Normocephalic and atraumatic.      Nose: Nose normal. No congestion or rhinorrhea.   Eyes:      General: No scleral icterus.        Right eye: No discharge.         Left eye: No discharge.   Cardiovascular:      Rate and Rhythm: Normal rate and regular rhythm.      Pulses: Normal pulses.      Heart sounds: No murmur heard.     No friction rub. No gallop.    Pulmonary:      Effort: Pulmonary effort is normal. No respiratory distress.      Breath sounds: No stridor. No wheezing, rhonchi or rales.   Chest:      Chest wall: No tenderness.   Abdominal:      General: Abdomen is flat. There is no distension.      Palpations: There is no mass.      Tenderness: There is no guarding or rebound.      Hernia: No hernia is present.   Musculoskeletal:         General: No swelling, tenderness, deformity or signs of injury.      Comments: Limited range of motion of right lower extremity   Skin:     General: Skin is warm.      Coloration: Skin is not jaundiced or pale.      Findings: No bruising or erythema.   Neurological:      General: No focal deficit present.      Mental Status: She is alert.      Cranial Nerves: No cranial nerve deficit.      Sensory: No sensory deficit.      Motor: No weakness.   Psychiatric:         Mood and Affect: Mood normal.         Behavior: Behavior normal.         Thought Content: Thought content normal.         Judgment: Judgment normal.              Condition at Discharge: stable       Discharge instructions/Information to patient and family:   See After Visit Summary (AVS) for information provided to patient and family.      Provisions for Follow-Up Care:  See after visit summary for information related to follow-up care and any pertinent home health orders.      PCP: No primary care provider on file.    Disposition: Home    Planned Readmission: No     Discharge Medications:  See after visit summary for reconciled discharge medications provided to patient and family.      Discharge Statement:  I have spent a total time of 40 minutes in caring for this patient on the day of the visit/encounter. >30 minutes of time was spent on: Diagnostic results, Prognosis, and Risks and benefits of tx options.

## 2024-12-09 NOTE — PROGRESS NOTES
Progress Note - Neurosurgery   Name: Ora Veliz 54 y.o. female I MRN: [id number]  Unit/Bed#: [facility] 619-01 I Date of Admission: 12/8/2024   Date of Service: 12/9/2024 I Hospital Day: 0    Assessment & Plan  Lumbar radiculopathy  Patient presents with several day history of right sided back pain and right lateral leg radiculopathy  Symptoms started several days ago without inciting event or injury  No history of back surgery or chronic pain  Patient denies urinary retention, bowel incontinence, saddle anesthesia  On exam, 4+/5 RHF, RPF.  Right lateral thigh and calf numbness, right lateral foot paresthesias.    Imaging:  MRI lumbar spine w/o, 12/8/24: Inferiorly extruded disc herniation at L5-S1 on the right compressing and displacing the S1 nerve root posteriorly and laterally. Correlate for right S1 radiculopathy. There is also mild right foraminal narrowing with disc material abutting the undersurface of the L5 nerve.    Plan:  Continue to monitor neurological exam  MRI personally reviewed with the patient and her , showing a large disc herniation at right L5-S1 likely causing her symptoms  maximize multimodal pain regimen  Tylenol 975 mg Q8H  Robaxin 750mg Q6H   Gabapentin 300mg QHS  Lidocaine patches  Medrol dose pack  Aqua K  Oxycodone 2.5/5mg Q4H PRN  IV dilaudid 0.2mg Q4H PRN  PT/OT evaluation - recommending outpatient PT.  Medical management per primary team  DVT ppx: SCDs, ok for pharm ppx  No neurosurgical intervention is anticipated at this time.  Recommend maximizing conservative management and trialing outpatient PT once pain is controlled enough for discharge.  If she fails conservative management we can discuss surgical intervention at that time.  However, given her BMI her risks of infection and intraoperative complications will be elevated.  She is agreeable to conservative management and would like to avoid surgery if possible.    Neurosurgery will sign off. Will schedule patient for clinical  follow up in 3 months.  Please call questions or concerns.    I have discussed the above management plan in detail with the primary service.   Neurosurgery service signing off.  Please contact the SecureChat role for the Neurosurgery service with any questions/concerns.    Subjective   Ongoing pain to R leg radiating laterally to calf.  No back pain.  Mobilized with PT/OT.  Overnight with some pain relieved with medications.  Would like to avoid surgery.    Objective :  Temp:  [97.6 °F (36.4 °C)-98.2 °F (36.8 °C)] 97.6 °F (36.4 °C)  HR:  [53-59] 53  BP: (142-144)/(67-69) 144/69  Resp:  [16-18] 16  SpO2:  [90 %-91 %] 91 %  O2 Device: None (Room air)    I/O         12/07 0701  12/08 0700 12/08 0701  12/09 0700 12/09 0701  12/10 0700    P.O.  1180     Total Intake  1180     Net  +1180            Unmeasured Urine Occurrence  6 x           Physical Exam  Vitals and nursing note reviewed.   Constitutional:       General: She is not in acute distress.     Appearance: She is well-developed. She is obese.   HENT:      Head: Normocephalic and atraumatic.   Eyes:      Conjunctiva/sclera: Conjunctivae normal.   Cardiovascular:      Rate and Rhythm: Normal rate and regular rhythm.      Heart sounds: No murmur heard.  Pulmonary:      Effort: Pulmonary effort is normal. No respiratory distress.      Breath sounds: Normal breath sounds.   Abdominal:      Palpations: Abdomen is soft.      Tenderness: There is no abdominal tenderness.   Musculoskeletal:         General: No swelling.      Cervical back: Neck supple.   Skin:     General: Skin is warm and dry.      Capillary Refill: Capillary refill takes less than 2 seconds.   Neurological:      Mental Status: She is alert.      Motor: Weakness present.   Psychiatric:         Mood and Affect: Mood normal.      Neurological Exam  Mental Status  Alert.    Motor    RHF 4/5  RDP 4/5  .    Sensory  R toes numb.        Lab Results: I have reviewed the following results:  Recent Labs      12/08/24  1055 12/09/24  0534   WBC 7.05 10.30*   HGB 13.9 14.3   HCT 43.9 46.0    261   SODIUM 139 139   K 4.2 3.9    103   CO2 25 30   BUN 18 15   CREATININE 0.69 0.66   GLUC 135 107   AST 15  --    ALT 16  --    ALB 3.7  --    TBILI 0.49  --    ALKPHOS 43  --        Imaging Results Review: I personally reviewed the following image studies in PACS and associated radiology reports: MRI spine. My interpretation of the radiology images/reports is: as above.  Other Study Results Review: No additional pertinent studies reviewed.    VTE Pharmacologic Prophylaxis: VTE covered by:    None    and Sequential compression device (Venodyne)

## 2024-12-09 NOTE — ASSESSMENT & PLAN NOTE
Patient presents with several day history of right sided back pain and right lateral leg radiculopathy  Symptoms started several days ago without inciting event or injury  No history of back surgery or chronic pain  Patient denies urinary retention, bowel incontinence, saddle anesthesia  On exam, 4+/5 RHF, RPF.  Right lateral thigh and calf numbness, right lateral foot paresthesias.    Imaging:  MRI lumbar spine w/o, 12/8/24: Inferiorly extruded disc herniation at L5-S1 on the right compressing and displacing the S1 nerve root posteriorly and laterally. Correlate for right S1 radiculopathy. There is also mild right foraminal narrowing with disc material abutting the undersurface of the L5 nerve.    Plan:  Continue to monitor neurological exam  MRI personally reviewed with the patient and her , showing a large disc herniation at right L5-S1 likely causing her symptoms  maximize multimodal pain regimen  Tylenol 975 mg Q8H  Robaxin 750mg Q6H   Gabapentin 300mg QHS  Lidocaine patches  Medrol dose pack  Aqua K  Oxycodone 2.5/5mg Q4H PRN  IV dilaudid 0.2mg Q4H PRN  PT/OT evaluation - recommending outpatient PT.  Medical management per primary team  DVT ppx: SCDs, ok for pharm ppx  No neurosurgical intervention is anticipated at this time.  Recommend maximizing conservative management and trialing outpatient PT once pain is controlled enough for discharge.  If she fails conservative management we can discuss surgical intervention at that time.  However, given her BMI her risks of infection and intraoperative complications will be elevated.  She is agreeable to conservative management and would like to avoid surgery if possible.    Neurosurgery will sign off. Will schedule patient for clinical follow up in 3 months.  Please call questions or concerns.

## 2024-12-09 NOTE — CASE MANAGEMENT
Case Management Discharge Planning Note    Patient name Ora Veliz  Location [facility] 619/[facility] 619-01 MRN [id number]  : 1970 Date 2024       Current Admission Date: 2024  Current Admission Diagnosis:Lumbar radiculopathy  Patient Active Problem List    Diagnosis Date Noted Date Diagnosed    Lumbar radiculopathy 2024       LOS (days): 0  Geometric Mean LOS (GMLOS) (days):   Days to GMLOS:     OBJECTIVE:      Current admission status: Observation   Preferred Pharmacy:   [id number] - [place] - [address]  [address] [place] [address]  Phone: [phone] Fax: [phone]    Primary Care Provider: No primary care provider on file.    Primary Insurance: BLUE CROSS  Secondary Insurance:     DISCHARGE DETAILS:    Discharge planning discussed with:: patient  Freedom of Choice: Yes  Comments - Freedom of Choice: Discussed FOC  CM contacted family/caregiver?: No- see comments  Were Treatment Team discharge recommendations reviewed with patient/caregiver?: Yes  Did patient/caregiver verbalize understanding of patient care needs?: Yes  Were patient/caregiver advised of the risks associated with not following Treatment Team discharge recommendations?: Yes    Other Referral/Resources/Interventions Provided:  Interventions: Outpatient PT  Referral Comments: This CM discussed therapy recs with pt. Therapy rec OP PT. Pt provided with list of facilities near home. Pt in agreement with dcp.    Treatment Team Recommendation: Outpatient Rehab  Discharge Destination Plan:: Outpatient Rehab

## 2024-12-09 NOTE — OCCUPATIONAL THERAPY NOTE
"    Occupational Therapy Evaluation     Patient Name: Ora Veliz  Today's Date: 12/9/2024  Problem List  Principal Problem:    Lumbar radiculopathy    Past Medical History  Past Medical History:   Diagnosis Date    Asthma      Past Surgical History  Past Surgical History:   Procedure Laterality Date    HERNIA REPAIR           12/09/24 0955   OT Last Visit   OT Visit Date 12/09/24   Note Type   Note type Evaluation   Pain Assessment   Pain Assessment Tool 0-10   Pain Score 6   Pain Location/Orientation Location: Back   Hospital Pain Intervention(s) Repositioned;Ambulation/increased activity;Emotional support;Relaxation technique   Restrictions/Precautions   Weight Bearing Precautions Per Order No   Other Precautions Fall Risk;Pain;Spinal precautions   Home Living   Type of Home House   Home Layout One level;Stairs to enter with rails   Home Equipment Cane   Additional Comments pt did not provide further information re: home setup  - became irritated with questions stating she already provided this information and can't we just look it up   Prior Function   Level of [place] Independent with ADLs;Independent with functional mobility;Independent with IADLS   Lives With Spouse   Receives Help From Family   IADLs Independent with meal prep;Independent with medication management;Family/Friend/Other provides transportation   Falls in the last 6 months 1 to 4   Lifestyle   Autonomy I adls and mobility - shares homemaking with family   Reciprocal Relationships supportive family and friends who are able to assist PRN   Intrinsic Gratification reports she walks 10K steps/day and likes to go to gym but has been unable to participate 2* back pain   Subjective   Subjective \"I'm just really frustrated\"   ADL   Eating Assistance 7  Independent   Grooming Assistance 5  Supervision/Setup   UB Bathing Assistance 5  Supervision/Setup   LB Bathing Assistance 5  Supervision/Setup   UB Dressing Assistance 5  Supervision/Setup   LB " Dressing Assistance 5  Supervision/Setup   Toileting Assistance  5  Supervision/Setup   Additional Comments reports she recently completed self care in BR by herself   Bed Mobility   Additional Comments oob in BR on approach   Transfers   Sit to Stand 5  Supervision   Stand to Sit 5  Supervision   Functional Mobility   Functional Mobility 5  Supervision   Balance   Static Sitting Fair +   Dynamic Sitting Fair   Static Standing Fair -   Dynamic Standing Fair -   Ambulatory Fair -   Activity Tolerance   Activity Tolerance Patient limited by fatigue;Patient limited by pain   Medical Staff Made Aware SHAMAR Bob, Jeffrey, DPT   RUE Assessment   RUE Assessment WFL   LUE Assessment   LUE Assessment WFL   Cognition   Overall Cognitive Status WFL   Assessment   Limitation Decreased endurance;Decreased self-care trans;Decreased high-level ADLs   Prognosis Good   Assessment Pt is a 54 y.o. female who was admitted to [facility] on 12/8/2024 with Lumbar radiculopathy MRI showed nferiorly extruded disc herniation at L5-S1 on the right compressing and displacing the S1 nerve root posteriorly and laterally. Correlate for right S1 radiculopathy. There is also mild right foraminal narrowing with disc material abutting the undersurface of the L5 nerve. Mild annular bulging and facet arthropathy at the other lower thoracic and lumbar levels with mild canal stenosis and foraminal narrowing . Patient  has a past medical history of Asthma.    At baseline pt was completing adls and mobility independently - I iadls - shares homemaking with family. Pt lives with spouse in 1 story home with 3 KAMILAH. Currently pt requires sba for overall ADLS and sba for functional mobility/transfers. Pt currently presents with impairments in the following categories -difficulty performing IADLS  activity tolerance, endurance, and standing balance/tolerance. These impairments, as well as pt's fatigue and pain  limit pt's ability to safely engage in all  baseline areas of occupation, includingfunctional mobility/transfers, community mobility, laundry , driving, house maintenance, medication management, meal prep, cleaning, social participation , and leisure activities  however has supportive family who are able to provide assist PRN - reviewed spinal precautions with good understanding -From OT standpoint, recommend Level III resources upon D/C. No immediate acute OT needs indicated - anticipate d/c home with family support when medically cleared- d/c from caseload   Goals   Patient Goals go home   Plan   OT Frequency Eval only   Discharge Recommendation   Rehab Resource Intensity Level, OT III (Minimum Resource Intensity)   AM-PAC Daily Activity Inpatient   Lower Body Dressing 4   Bathing 4   Toileting 4   Upper Body Dressing 4   Grooming 4   Eating 4   Daily Activity Raw Score 24   Daily Activity Standardized Score (Calc for Raw Score >=11) 57.54   AM-PAC Applied Cognition Inpatient   Following a Speech/Presentation 4   Understanding Ordinary Conversation 4   Taking Medications 4   Remembering Where Things Are Placed or Put Away 4   Remembering List of 4-5 Errands 4   Taking Care of Complicated Tasks 4   Applied Cognition Raw Score 24   Applied Cognition Standardized Score 62.21   End of Consult   Education Provided Yes   Patient Position at End of Consult Standing;All needs within reach   Nurse Communication Nurse aware of consult       Documentation Completed By:    NORMA Whitman/L  [employer] Certified - [id number]

## 2024-12-12 ENCOUNTER — TELEPHONE (OUTPATIENT)
Age: 54
End: 2024-12-12

## 2024-12-12 NOTE — ED ATTENDING ATTESTATION
12/7/2024  I, Aureliano Kelley DO, saw and evaluated the patient. I have discussed the patient with the resident/non-physician practitioner and agree with the resident's/non-physician practitioner's findings, Plan of Care, and MDM as documented in the resident's/non-physician practitioner's note, except where noted. All available labs and Radiology studies were reviewed.  I was present for key portions of any procedure(s) performed by the resident/non-physician practitioner and I was immediately available to provide assistance.       At this point I agree with the current assessment done in the Emergency Department.  I have conducted an independent evaluation of this patient a history and physical is as follows:    54-year-old female presents to the emergency department with noted transfer from [place] for noted MRI, patient has been having new neurological symptoms with noted back pain, here for evaluation and MRI,    MRI performed at this point in time with noted evidence of disc herniation, will admit for pain control and neurosurgery consultation    ED Course         Critical Care Time  Procedures

## 2024-12-12 NOTE — TELEPHONE ENCOUNTER
12/12/2024- PT WAS DISCHARGED HOME. ATTEMPTED TO CALL PT BUT CALL COULD NOT BE COMPLETED AT THIS TIME. WILL TRY AGAIN IN A FEW DAYS

## 2024-12-12 NOTE — TELEPHONE ENCOUNTER
Pt called in stating she was discharged from the hospital on 12/9. She would be considered a new patient. Please call pt.

## 2024-12-13 ENCOUNTER — RA CDI HCC (OUTPATIENT)
Dept: OTHER | Facility: HOSPITAL | Age: 54
End: 2024-12-13

## 2024-12-18 ENCOUNTER — EVALUATION (OUTPATIENT)
Dept: PHYSICAL THERAPY | Facility: MEDICAL CENTER | Age: 54
End: 2024-12-18
Payer: COMMERCIAL

## 2024-12-18 DIAGNOSIS — M54.16 LUMBAR RADICULOPATHY: ICD-10-CM

## 2024-12-18 DIAGNOSIS — M54.41 ACUTE BILATERAL LOW BACK PAIN WITH RIGHT-SIDED SCIATICA: Primary | ICD-10-CM

## 2024-12-18 PROCEDURE — 97161 PT EVAL LOW COMPLEX 20 MIN: CPT | Performed by: PHYSICAL THERAPIST

## 2024-12-18 PROCEDURE — 97110 THERAPEUTIC EXERCISES: CPT | Performed by: PHYSICAL THERAPIST

## 2024-12-18 NOTE — PROGRESS NOTES
PT Evaluation     Today's date: 2024  Patient name: Ora Veliz  : 1970  MRN: [id number]  Referring provider: Sherwin Redman DO  Dx:   Encounter Diagnosis     ICD-10-CM    1. Acute bilateral low back pain with right-sided sciatica  M54.41 Ambulatory Referral to Physical Therapy      2. Lumbar radiculopathy  M54.16           Start Time: 1115  Stop Time: 1200  Total time in clinic (min): 45 minutes    Assessment/Plan  Ora Veliz is a 54 y.o. female who was referred to physical therapy for management of acute R sided low back pain with medical dx of lumbar radiculopathy.  Primary impairments include report of severe pain that significantly limited ability to capture objective measurements.  Consequently, patient has difficulty completing ADLs including prolonged standing/ambulation, sitting, and completing all ADLs.  rOa would benefit from skilled intervention to address all deficits and improve functional capability.  Patient is a good candidate for therapy, pending compliance with HEP and consistent participation in physical therapy.  Thank you for the referral and please do not hesitate to contact me with any questions or concerns regarding Ora’s care!      Plan  Frequency: 2x per week   Duration in weeks: 12   POC start date: 24  POC end date: 3/12/25  Therapeutic exercise/activity, neuromuscular reeducation, manual therapy, and modalities.   Patient understands and agrees to plan of care.    Goals  Short Term--4 weeks  1. Patient will report that pain is intermittent with 2 point decrease in pain at its worst.   2. Patient will demonstrate 1/2 point increase in all deficient MMT scores.  3. Patient will demonstrate ability to self-manage sx with HEP.    Long Term--By Discharge  1. Patient will achieve expected FOTO score.  2. Patient will report no limitation with ambulation secondary to lower back.  3. Patient will report no sleep disturbance secondary to lower back.  4. Patient will report  "no difficulty transitioning off of toilet/stepping into tub secondary to lower back.  Patient's Goal: Get rid of the pain. Be able to do daily activities with trouble.       Subjective    History   Date of Onset: 12/1/24 Description: Insidious onset of coccyx pain (which she originally thought was an old injury acting up because of the weather) that progressively worsened and radiated superiorly across the lower back. After about a week since the pain first started she began to experience shooting pain down her R leg and her first toe went completely numb. She had her  take her to the [facility] ER on a Saturday afternoon, where CT scan was performed. Because of findings, it was decided that an MRI was needed, so patient had to be transferred to [facility]. MRI findings below, and patient was kept for observation until Monday.     Symptoms  Constant R sided posterior leg pain that is aggravated with transitioning off of the toilet, stepping into the tub to shower, walking short distances, prolonged standing, and sitting on soft surfaces. Sx can radiate down to her ankle, and she still experiences intermittent numbness in her big toe.     Patient is currently sleeping on her couch because her bedroom is on the second floor but the bathroom is on the first floor. She has not attempted steps since being discharged home. She also tends to \"furniture surf\" when walking around her home because she is afraid of falling and the pain is often exacerbated.       Pain at best: 5  Pain at worst: 10    Imaging-- 12/8/24 MRI Lumbar     IMPRESSION:  Inferiorly extruded disc herniation at L5-S1 on the right compressing and displacing the S1 nerve root posteriorly and laterally. Correlate for right S1 radiculopathy.  There is also mild right foraminal narrowing with disc material abutting the undersurface of the L5 nerve.  Mild annular bulging and facet arthropathy at the other lower thoracic and lumbar levels with " mild canal stenosis and foraminal narrowing    Social History  Ora lives with her  in a multistory home with bedroom on second floor.        Objective    Lumbar  % of normal   Flex. 100 P!!   Extn. 100   SB Left 100   SB Right 100   ROT Left 100   ROT Right 100 P!   Repetitive testing: extension= sustained prone no worse   Flexion= worse        MMT         AROM          PROM    Hip       L       R        L           R      L     R   Flex.         Extn.         Abd.         IR.         ER.                       MMT    Knee         L        R   Flex.     Extn.                  MMT    Ankle       L        R   PF     DF.     EHL       Posture: Hips shifted to L in standing    Dermatome: (light touch- L/R):  TBA     Reflexes:  (L/R) L4: TBA       S1:   TBA      Slump test: L=  TBA   R=   TBA    Straight leg raise:   L=   TBA   R=    TBA                    SI joint: Standing flex =   POS R SIJ hypomobility     Active SLR =          Active SLR w/ stabilization =        Supine to sit =               Daily Treatment Diary     Precautions: universal.   HEP ACCESS CODE:   FOTO Completed On: 12/18/24    POC Expires Unit Limit Auth Expiration Date PT/OT/STVisit Limit   3/12/25 bomnb N/a 90 PCY                     Auth Status DATE 12/18        90v PCY Visit # 1         Remaining 89        MANUAL THERAPY                                                               THERAPEUTIC EXERCISE HEP         Sustained prone trial                                                                                                                                           Pt education Pathology/centralization         NEUROMUSCULAR REEDUCATION                                                                                                                                                       THERAPEUTIC ACTIVITY                                                  GAIT TRAINING                                                  MODALITIES

## 2024-12-19 ENCOUNTER — OFFICE VISIT (OUTPATIENT)
Dept: FAMILY MEDICINE CLINIC | Facility: CLINIC | Age: 54
End: 2024-12-19
Payer: COMMERCIAL

## 2024-12-19 VITALS
HEIGHT: 69 IN | OXYGEN SATURATION: 98 % | HEART RATE: 86 BPM | DIASTOLIC BLOOD PRESSURE: 80 MMHG | TEMPERATURE: 98 F | WEIGHT: 293 LBS | SYSTOLIC BLOOD PRESSURE: 124 MMHG | BODY MASS INDEX: 43.4 KG/M2 | RESPIRATION RATE: 16 BRPM

## 2024-12-19 DIAGNOSIS — K21.9 GERD (GASTROESOPHAGEAL REFLUX DISEASE): ICD-10-CM

## 2024-12-19 DIAGNOSIS — M54.16 LUMBAR RADICULOPATHY: ICD-10-CM

## 2024-12-19 DIAGNOSIS — M54.50 LOW BACK PAIN: ICD-10-CM

## 2024-12-19 DIAGNOSIS — Z00.00 ANNUAL PHYSICAL EXAM: ICD-10-CM

## 2024-12-19 DIAGNOSIS — Z12.31 ENCOUNTER FOR SCREENING MAMMOGRAM FOR BREAST CANCER: ICD-10-CM

## 2024-12-19 DIAGNOSIS — Z12.11 SCREEN FOR COLON CANCER: Primary | ICD-10-CM

## 2024-12-19 PROCEDURE — 99203 OFFICE O/P NEW LOW 30 MIN: CPT | Performed by: INTERNAL MEDICINE

## 2024-12-19 RX ORDER — PREDNISONE 10 MG/1
TABLET ORAL
Qty: 30 TABLET | Refills: 0 | Status: SHIPPED | OUTPATIENT
Start: 2024-12-19

## 2024-12-19 RX ORDER — GABAPENTIN 300 MG/1
600 CAPSULE ORAL 3 TIMES DAILY
Qty: 180 CAPSULE | Refills: 1 | Status: SHIPPED | OUTPATIENT
Start: 2024-12-19

## 2024-12-19 RX ORDER — OXYCODONE AND ACETAMINOPHEN 5; 325 MG/1; MG/1
1 TABLET ORAL EVERY 8 HOURS PRN
Qty: 100 TABLET | Refills: 0 | Status: SHIPPED | OUTPATIENT
Start: 2024-12-19 | End: 2024-12-20 | Stop reason: SDUPTHER

## 2024-12-19 RX ORDER — PANTOPRAZOLE SODIUM 40 MG/1
40 TABLET, DELAYED RELEASE ORAL
Qty: 30 TABLET | Refills: 0 | Status: SHIPPED | OUTPATIENT
Start: 2024-12-19

## 2024-12-19 RX ORDER — OXYCODONE AND ACETAMINOPHEN 5; 325 MG/1; MG/1
1 TABLET ORAL EVERY 8 HOURS PRN
Qty: 100 TABLET | Refills: 0 | Status: SHIPPED | OUTPATIENT
Start: 2024-12-19 | End: 2024-12-19

## 2024-12-19 RX ORDER — OXYCODONE AND ACETAMINOPHEN 5; 325 MG/1; MG/1
1 TABLET ORAL EVERY 8 HOURS PRN
Qty: 100 TABLET | Refills: 0 | OUTPATIENT
Start: 2024-12-19

## 2024-12-19 RX ORDER — METHOCARBAMOL 750 MG/1
750 TABLET, FILM COATED ORAL EVERY 6 HOURS SCHEDULED
Qty: 120 TABLET | Refills: 3 | Status: SHIPPED | OUTPATIENT
Start: 2024-12-19

## 2024-12-19 NOTE — PROGRESS NOTES
Transition of Care Visit  Name: Ora Veliz      : 1970      MRN: [id number]  Encounter Provider: Melissa Aldana MD  Encounter Date: 2024   Encounter department: [facility]    Assessment & Plan  Lumbar radiculopathy    Orders:    predniSONE 10 mg tablet; 1 tab QID x 3 days then 1  TID 3 days then 1 BID then 1 PO daily x 3 day    Ambulatory Referral to Neurosurgery; Future    oxyCODONE-acetaminophen (Percocet) 5-325 mg per tablet; Take 1 tablet by mouth every 8 (eight) hours as needed for moderate pain Max Daily Amount: 3 tablets    Encounter for screening mammogram for breast cancer    Orders:    Mammo screening bilateral w 3d and cad; Future    Screen for colon cancer    Orders:    Ambulatory Referral to Gastroenterology; Future    Annual physical exam         GERD (gastroesophageal reflux disease)    Orders:    pantoprazole (PROTONIX) 40 mg tablet; Take 1 tablet (40 mg total) by mouth daily in the early morning    Low back pain    Orders:    methocarbamol (ROBAXIN) 750 mg tablet; Take 1 tablet (750 mg total) by mouth every 6 (six) hours    gabapentin (NEURONTIN) 300 mg capsule; Take 2 capsules (600 mg total) by mouth 3 (three) times a day         History of Present Illness     Transitional Care Management Review:   Ora Veliz is a 54 y.o. female here for TCM follow up.     During the TCM phone call patient stated:  TCM Call       None          TCM Call       None          Was admitted to the hospital for an MRI and received treatment while she was there and sent home to try physical therapy    Back Pain  This is a new problem. The current episode started more than 1 month ago. The problem occurs constantly. The problem has been waxing and waning since onset. The pain is present in the lumbar spine. The quality of the pain is described as shooting. The pain radiates to the right knee and right foot. The pain is at a severity of 8/10. The pain is severe. The pain is The same all the time.  "The symptoms are aggravated by bending, twisting and position. Stiffness is present All day. Associated symptoms include numbness and weakness (I will putmild foot). Pertinent negatives include no bladder incontinence, bowel incontinence, dysuria, fever or perianal numbness. Risk factors include menopause and obesity. She has tried analgesics, heat, ice, muscle relaxant and NSAIDs for the symptoms. The treatment provided moderate relief.     Review of Systems   Constitutional:  Positive for fatigue and unexpected weight change (she has gained a little since pain started). Negative for appetite change and fever.   HENT: Negative.     Eyes: Negative.    Respiratory: Negative.     Cardiovascular: Negative.    Gastrointestinal: Negative.  Negative for bowel incontinence.   Genitourinary: Negative.  Negative for bladder incontinence and dysuria.   Musculoskeletal:  Positive for back pain and gait problem.   Skin: Negative.    Neurological:  Positive for weakness (I will putmild foot) and numbness.     Objective   /80 (BP Location: Right arm, Patient Position: Sitting, Cuff Size: Large)   Pulse 86   Temp 98 °F (36.7 °C) (Temporal)   Resp 16   Ht 5' 8.5\" (1.74 m)   Wt (!) 173 kg (381 lb)   SpO2 98%   BMI 57.09 kg/m²     Physical Exam  Constitutional:       Appearance: She is obese.   HENT:      Head: Normocephalic and atraumatic.      Right Ear: External ear normal.      Left Ear: External ear normal.      Nose: Nose normal.      Mouth/Throat:      Mouth: Mucous membranes are moist.   Eyes:      Extraocular Movements: Extraocular movements intact.      Pupils: Pupils are equal, round, and reactive to light.   Neck:      Vascular: No carotid bruit.   Cardiovascular:      Rate and Rhythm: Normal rate and regular rhythm.      Pulses: Normal pulses.      Heart sounds: Normal heart sounds.   Pulmonary:      Effort: Pulmonary effort is normal.      Breath sounds: Normal breath sounds.   Abdominal:      General: " Bowel sounds are normal.      Palpations: Abdomen is soft.   Musculoskeletal:         General: No swelling.      Cervical back: Normal range of motion. No rigidity.      Right lower leg: No edema (minimal bilat).      Left lower leg: No edema.   Skin:     Capillary Refill: Capillary refill takes less than 2 seconds.      Findings: No bruising, erythema or rash.   Neurological:      Mental Status: She is alert and oriented to person, place, and time.      Motor: Weakness (mild right) present.      Gait: Gait abnormal.   Psychiatric:         Mood and Affect: Mood normal.         Behavior: Behavior normal.         Thought Content: Thought content normal.         Judgment: Judgment normal.       Medications have been reviewed by provider in current encounter

## 2024-12-20 ENCOUNTER — TELEPHONE (OUTPATIENT)
Age: 54
End: 2024-12-20

## 2024-12-20 DIAGNOSIS — M54.16 LUMBAR RADICULOPATHY: ICD-10-CM

## 2024-12-20 RX ORDER — OXYCODONE AND ACETAMINOPHEN 5; 325 MG/1; MG/1
1 TABLET ORAL EVERY 8 HOURS PRN
Qty: 100 TABLET | Refills: 0 | Status: SHIPPED | OUTPATIENT
Start: 2024-12-20

## 2024-12-20 NOTE — TELEPHONE ENCOUNTER
12/20/2024- PT WAS DISCHARGED HOME. CALLED PT AND LEFT MESSAGE ON MACHINE CONFIRMING 03/10/2025 APT W/ NO IMAGING NEEDED PRIOR. OFFICE PHONE NUMBER WAS PROVIDED FOR ANY QUESTIONS.

## 2024-12-20 NOTE — TELEPHONE ENCOUNTER
Patient called stating Dr Aldana prescribed her Oxycodone yesterday, but it appears the transmission to pharmacy failed. Patient was able to get all of her medications when picking them up, except for that one. Please advise.

## 2024-12-23 ENCOUNTER — OFFICE VISIT (OUTPATIENT)
Dept: PHYSICAL THERAPY | Facility: MEDICAL CENTER | Age: 54
End: 2024-12-23
Payer: COMMERCIAL

## 2024-12-23 DIAGNOSIS — M54.41 ACUTE BILATERAL LOW BACK PAIN WITH RIGHT-SIDED SCIATICA: Primary | ICD-10-CM

## 2024-12-23 DIAGNOSIS — M54.16 LUMBAR RADICULOPATHY: ICD-10-CM

## 2024-12-23 PROCEDURE — 97110 THERAPEUTIC EXERCISES: CPT | Performed by: PHYSICAL THERAPIST

## 2024-12-23 NOTE — PROGRESS NOTES
"Daily Note     Today's date: 2024  Patient name: Ora Veliz  : 1970  MRN: [id number]  Referring provider: Sherwin Redman DO  Dx:   Encounter Diagnosis     ICD-10-CM    1. Acute bilateral low back pain with right-sided sciatica  M54.41       2. Lumbar radiculopathy  M54.16           Start Time: 0800  Stop Time: 0840  Total time in clinic (min): 40 minutes    Subjective: Patient reports that she was given a longer prednisone taper, which has made significant different in sx. She was able to tolerate standing in the shower long enough to wash her hair and also cooked a simple dinner, which she has not done since the pain started. However, sx still can still be very painful with too much WBing activity. She has been choosing more supportive seating when available and attempting to lie prone for a few minutes throughout the day.       Objective: See treatment diary below      Assessment: Prone progression was attempted today with some centralization/decrease in sx achieved. However, this was undone when transitioning off of the table. Trial of standing exercises performed due to increased ease of completing at home. Patient experienced good relief with both lateral glides (L hip into wall) and extension (dropping hips toward wall). She was instructed to complete lateral glides 2x per day and extensions 5x per day as long as sx centralize/improve.       Plan: Continue per plan of care.      Daily Treatment Diary     Precautions: universal.   HEP ACCESS CODE:   FOTO Completed On: 24    POC Expires Unit Limit Auth Expiration Date PT/OT/STVisit Limit   3/12/25 bomnb N/a 90 PCY                     Auth Status DATE        90v PCY Visit # 1 2        Remaining 89 88       MANUAL THERAPY                                                               THERAPEUTIC EXERCISE HEP         Sustained prone trial  Progression 15'  hold      L hip into wall standing   10x5\"       Standing ext (hips into wall) " "  10x5\"                                                                                                                     Pt education/HEP  Pathology/centralization 15'       NEUROMUSCULAR REEDUCATION                                                                                                                                                       THERAPEUTIC ACTIVITY                                                  GAIT TRAINING                                                  MODALITIES                                    "

## 2024-12-30 ENCOUNTER — OFFICE VISIT (OUTPATIENT)
Dept: PHYSICAL THERAPY | Facility: MEDICAL CENTER | Age: 54
End: 2024-12-30
Payer: COMMERCIAL

## 2024-12-30 DIAGNOSIS — M54.16 LUMBAR RADICULOPATHY: ICD-10-CM

## 2024-12-30 DIAGNOSIS — M54.41 ACUTE BILATERAL LOW BACK PAIN WITH RIGHT-SIDED SCIATICA: Primary | ICD-10-CM

## 2024-12-30 PROCEDURE — 97112 NEUROMUSCULAR REEDUCATION: CPT | Performed by: PHYSICAL THERAPIST

## 2024-12-30 PROCEDURE — 97140 MANUAL THERAPY 1/> REGIONS: CPT | Performed by: PHYSICAL THERAPIST

## 2024-12-30 PROCEDURE — 97110 THERAPEUTIC EXERCISES: CPT | Performed by: PHYSICAL THERAPIST

## 2024-12-30 NOTE — PROGRESS NOTES
"Daily Note     Today's date: 2024  Patient name: Ora Veliz  : 1970  MRN: [id number]  Referring provider: Sherwin Redman DO  Dx:   Encounter Diagnosis     ICD-10-CM    1. Acute bilateral low back pain with right-sided sciatica  M54.41       2. Lumbar radiculopathy  M54.16           Start Time: 0915  Stop Time: 1000  Total time in clinic (min): 45 minutes    Subjective: Patient reports that she has one more day of steroid taper tomorrow. Step count was up to 6000/day but has decreased some over the past two days. Sleep was terrible last night but overall she is still improved.       Objective: See treatment diary below    Standing posture: Severe lateral shift with hips to L  Standing flexion: POS R SIJ hypomobility  Supine to Sit: (1) POS R AI --> 4x5\" MET (2) POS L upslip --> gr 4-5 L LAD (3) POS R AI (improved)-->2x5\" MET (4) neg    Assessment: Patient's sx report consistent with possible SIJ pathology so SIJ screened and treated per above. Following manuals, patient demonstrated significant improvement in gait pattern/tolerance and standing posture. However, lateral shift still present, which was addressed with self mobilization into wall. This exercise and repeated extensions further improved standing posture/gait pattern/reported tolerance. Sciatic sliders initiated and added to HEP to address intermittent RLE pain that is typically set off when patient stretches out her leg. Continue to address sx, as tolerated.       Plan: Continue per plan of care.      Daily Treatment Diary     Precautions: universal.   HEP ACCESS CODE:   FOTO Completed On: 24    POC Expires Unit Limit Auth Expiration Date PT/OT/STVisit Limit   3/12/25 bomnb N/a 90 PCY                     Auth Status DATE       90v PCY Visit # 1 2 3       Remaining 89 [phone] 87      MANUAL THERAPY         SIJ assess/treat   JAB                                                   THERAPEUTIC EXERCISE HEP         Sustained " "prone trial  Progression 15'  hold      L hip into wall standing   10x5\" hold      Standing ext (hips into wall) Added 12/30  10x5\" 25x5\" throughout session      R hip into wall standing    2x10 5\"                                                                                                          Pt education/HEP  Pathology/centralization 15' 15'      NEUROMUSCULAR REEDUCATION           Seated sciatic nerve sliders Added 12/30   2x10 B      HL PB     10x5\"      Standing PPT c glute iso    10x5\"                                                                                                                    THERAPEUTIC ACTIVITY                                                  GAIT TRAINING                                                  MODALITIES                                      "

## 2025-01-17 ENCOUNTER — RA CDI HCC (OUTPATIENT)
Dept: OTHER | Facility: HOSPITAL | Age: 55
End: 2025-01-17

## 2025-01-17 NOTE — PROGRESS NOTES
Please review if this dx is applicable to the patient's condition and assess and document, if applicable in next visit    HCC coding opportunities          Chart Reviewed number of suggestions sent to Provider: 1     Patients Insurance        Commercial Insurance: [employer]

## 2025-01-20 ENCOUNTER — OFFICE VISIT (OUTPATIENT)
Dept: FAMILY MEDICINE CLINIC | Facility: CLINIC | Age: 55
End: 2025-01-20
Payer: COMMERCIAL

## 2025-01-20 VITALS
BODY MASS INDEX: 43.4 KG/M2 | SYSTOLIC BLOOD PRESSURE: 116 MMHG | TEMPERATURE: 97.3 F | WEIGHT: 293 LBS | OXYGEN SATURATION: 95 % | RESPIRATION RATE: 16 BRPM | DIASTOLIC BLOOD PRESSURE: 76 MMHG | HEIGHT: 69 IN | HEART RATE: 71 BPM

## 2025-01-20 DIAGNOSIS — E66.01 MORBID OBESITY WITH BMI OF 50.0-59.9, ADULT (HCC): ICD-10-CM

## 2025-01-20 DIAGNOSIS — M54.16 LUMBAR BACK PAIN WITH RADICULOPATHY AFFECTING RIGHT LOWER EXTREMITY: Primary | ICD-10-CM

## 2025-01-20 PROCEDURE — 99213 OFFICE O/P EST LOW 20 MIN: CPT | Performed by: INTERNAL MEDICINE

## 2025-01-20 NOTE — ASSESSMENT & PLAN NOTE
Pt continues to have back pain, and doesn't think the meds helped much and she is concerned about the weight gain will re becky to pain and spine    Orders:    Ambulatory referral to Spine & Pain Management; Future

## 2025-01-20 NOTE — PROGRESS NOTES
"Name: Ora Veliz      : 1970      MRN: [id number]  Encounter Provider: Melissa Aldana MD  Encounter Date: 2025   Encounter department: [facility]  :  Assessment & Plan  Lumbar back pain with radiculopathy affecting right lower extremity  Pt continues to have back pain, and doesn't think the meds helped much and she is concerned about the weight gain will re becky to pain and spine    Orders:    Ambulatory referral to Spine & Pain Management; Future    Morbid obesity with BMI of 50.0-59.9, adult (HCC)  She continues to gain weight                History of Present Illness   Back Pain  This is a new problem. The current episode started more than 1 month ago. The problem occurs constantly. The problem is unchanged. The pain is present in the lumbar spine. The pain is at a severity of 6/10. The pain is moderate. The pain is The same all the time. The symptoms are aggravated by bending, lying down, sitting, twisting, standing and position. Stiffness is present All day. (No associated sympyoms) Risk factors include lack of exercise, menopause, obesity and sedentary lifestyle. She has tried heat, ice, muscle relaxant, NSAIDs, home exercises and analgesics for the symptoms. The treatment provided no relief.     Review of Systems   Cardiovascular:  Positive for leg swelling.   Musculoskeletal:  Positive for back pain.   Skin:  Negative for rash.   Neurological: Negative.    Hematological: Negative.    Psychiatric/Behavioral: Negative.     All other systems reviewed and are negative.      Objective   /76 (BP Location: Left arm, Patient Position: Sitting, Cuff Size: Large)   Pulse 71   Temp (!) 97.3 °F (36.3 °C) (Temporal)   Resp 16   Ht 5' 8.5\" (1.74 m)   Wt (!) 177 kg (391 lb)   SpO2 95%   BMI 58.59 kg/m²      Physical Exam  Vitals and nursing note reviewed.   Constitutional:       General: She is not in acute distress.     Appearance: She is well-developed. She is obese.   HENT:      " Head: Normocephalic and atraumatic.   Eyes:      Conjunctiva/sclera: Conjunctivae normal.   Cardiovascular:      Rate and Rhythm: Normal rate and regular rhythm.      Heart sounds: No murmur heard.  Pulmonary:      Effort: Pulmonary effort is normal. No respiratory distress.      Breath sounds: Normal breath sounds.   Abdominal:      Palpations: Abdomen is soft.      Tenderness: There is no abdominal tenderness.   Musculoskeletal:         General: No swelling.      Cervical back: Neck supple.      Right lower leg: No edema.      Left lower leg: No edema.   Skin:     General: Skin is warm and dry.      Capillary Refill: Capillary refill takes less than 2 seconds.      Findings: Rash present. No bruising.   Neurological:      General: No focal deficit present.      Mental Status: She is alert and oriented to person, place, and time.      Cranial Nerves: No cranial nerve deficit.      Sensory: Sensory deficit present.      Coordination: Coordination normal.      Gait: Gait abnormal (wide based).      Deep Tendon Reflexes: Reflexes normal.   Psychiatric:         Mood and Affect: Mood normal.         Behavior: Behavior normal.

## 2025-01-24 ENCOUNTER — OFFICE VISIT (OUTPATIENT)
Dept: PHYSICAL THERAPY | Facility: MEDICAL CENTER | Age: 55
End: 2025-01-24
Payer: COMMERCIAL

## 2025-01-24 DIAGNOSIS — M54.41 ACUTE BILATERAL LOW BACK PAIN WITH RIGHT-SIDED SCIATICA: Primary | ICD-10-CM

## 2025-01-24 DIAGNOSIS — M54.16 LUMBAR RADICULOPATHY: ICD-10-CM

## 2025-01-24 PROCEDURE — 97112 NEUROMUSCULAR REEDUCATION: CPT

## 2025-01-24 PROCEDURE — 97110 THERAPEUTIC EXERCISES: CPT

## 2025-01-24 PROCEDURE — 97140 MANUAL THERAPY 1/> REGIONS: CPT

## 2025-01-24 NOTE — PROGRESS NOTES
"Daily Note     Today's date: 2025  Patient name: Ora Veliz  : 1970  MRN: [id number]  Referring provider: Sherwin Redman DO  Dx:   Encounter Diagnosis     ICD-10-CM    1. Acute bilateral low back pain with right-sided sciatica  M54.41       2. Lumbar radiculopathy  M54.16           Start Time: 1020  Stop Time: 1116  Total time in clinic (min): 56 minutes    Subjective: Patient stated that she was not too bad until she got into her jeep.      Objective: See treatment diary below        Assessment: Patient tolerated treatment well. Needed rest breaks and time to complete exercises. Patient noted improvements in symptoms in foot with prolonged extensions with press ups. Added some prone press ups on elbows while in prolonged extension positioning. Patient educated on POC going forward and importance of positioning and core musculature, verbalized understanding. Patient demonstrated fatigue post treatment, demonstrated good technique on exercises and would continue to benefit from skilled PT.       Plan: Continue per plan of care.      Daily Treatment Diary     Precautions: universal.   HEP ACCESS CODE:   FOTO Completed On: 24    POC Expires Unit Limit Auth Expiration Date PT/OT/STVisit Limit   3/12/25 bomnb N/a 90 PCY                     Auth Status DATE      90v PCY Visit # 1 2 3 4      Remaining [phone]     MANUAL THERAPY         SIJ assess/treat   JAB                                                   THERAPEUTIC EXERCISE HEP         Sustained prone trial  Progression 15'  hold 5' with ppu on elbows     L hip into wall standing   10x5\" hold      Standing ext (hips into wall) Added   10x5\" 25x5\" throughout session      R hip into wall standing    2x10 5\"      Adduction     20x 5\"                                   Pt education/HEP  Pathology/centralization 15' 15' 10'     NEUROMUSCULAR REEDUCATION           Seated sciatic nerve sliders Added    2x10 B 2x10 B   " "  HL PB     10x5\"      Standing PPT c glute iso    10x5\"      Adduction     20x 5\"     Hip Abduction     2x10 ea standing     Hip Extension     10x ea standing                                   THERAPEUTIC ACTIVITY                                                  GAIT TRAINING                                                  MODALITIES                                      "

## 2025-01-30 ENCOUNTER — OFFICE VISIT (OUTPATIENT)
Dept: PHYSICAL THERAPY | Facility: MEDICAL CENTER | Age: 55
End: 2025-01-30
Payer: COMMERCIAL

## 2025-01-30 DIAGNOSIS — M54.16 LUMBAR RADICULOPATHY: ICD-10-CM

## 2025-01-30 DIAGNOSIS — M54.41 ACUTE BILATERAL LOW BACK PAIN WITH RIGHT-SIDED SCIATICA: Primary | ICD-10-CM

## 2025-01-30 PROCEDURE — 97110 THERAPEUTIC EXERCISES: CPT

## 2025-01-30 PROCEDURE — 97112 NEUROMUSCULAR REEDUCATION: CPT

## 2025-01-30 NOTE — PROGRESS NOTES
"Daily Note     Today's date: 2025  Patient name: Ora Veliz  : 1970  MRN: [id number]  Referring provider: Sherwin Redman DO  Dx:   Encounter Diagnosis     ICD-10-CM    1. Acute bilateral low back pain with right-sided sciatica  M54.41       2. Lumbar radiculopathy  M54.16           Start Time: 09  Stop Time: 1000  Total time in clinic (min): 46 minutes    Subjective: Patient stated that she was able to lay on her stomach a little more over the past few days. She reports that she was able to lean over her sink to dye her hair too. She reports needed less medicine.      Objective: See treatment diary below        Assessment: Patient tolerated treatment well. Patient with better tolerance to prone position, able to complete more reps on PPUs today during prolonged positioning. Some deficits present in BL quadriceps length BL demonstrated during prone quadriceps stretch. Patient demonstrated fatigue post treatment, demonstrated good technique on exercises and would continue to benefit from skilled PT.       Plan: Continue per plan of care.      Daily Treatment Diary     Precautions: universal.   HEP ACCESS CODE:   FOTO Completed On: 24    POC Expires Unit Limit Auth Expiration Date PT/OT/STVisit Limit   3/12/25 bomnb N/a 90 PCY                     Auth Status DATE     90v PCY Visit # 1 2 3 4 5     Remaining [phone]    MANUAL THERAPY         SIJ assess/treat   JAB                                                   THERAPEUTIC EXERCISE HEP         Sustained prone trial  Progression 15'  hold 5' with ppu on elbows 5' with ppu on elbows    L hip into wall standing   10x5\" hold      Standing ext (hips into wall) Added   10x5\" 25x5\" throughout session      R hip into wall standing    2x10 5\"      Adduction     20x 5\"     Seated Hamstring Stretch      30\" 3x ea    Quadriceps Stretch      30\" 3x ea                                  Pt education/HEP  " "Pathology/centralization 15' 15' 10'     NEUROMUSCULAR REEDUCATION           Seated sciatic nerve sliders Added 12/30   2x10 B 2x10 B 2x10 B    HL PB     10x5\"      Standing PPT c glute iso    10x5\"      Adduction     20x 5\" 20x 5\"    Hip Abduction     2x10 ea standing 2x10 ea standing    Hip Extension     10x ea standing 2x10 ea standing                                  THERAPEUTIC ACTIVITY                                                  GAIT TRAINING                                                  MODALITIES                                      "

## 2025-02-03 ENCOUNTER — CONSULT (OUTPATIENT)
Dept: PAIN MEDICINE | Facility: CLINIC | Age: 55
End: 2025-02-03
Payer: COMMERCIAL

## 2025-02-03 VITALS — BODY MASS INDEX: 44.41 KG/M2 | HEIGHT: 68 IN | WEIGHT: 293 LBS

## 2025-02-03 DIAGNOSIS — M54.16 LUMBAR BACK PAIN WITH RADICULOPATHY AFFECTING RIGHT LOWER EXTREMITY: ICD-10-CM

## 2025-02-03 DIAGNOSIS — M51.26 LUMBAR DISC HERNIATION: Primary | ICD-10-CM

## 2025-02-03 PROCEDURE — 99244 OFF/OP CNSLTJ NEW/EST MOD 40: CPT | Performed by: PAIN MEDICINE

## 2025-02-03 RX ORDER — GABAPENTIN 400 MG/1
400 CAPSULE ORAL 3 TIMES DAILY
Qty: 90 CAPSULE | Refills: 3 | Status: SHIPPED | OUTPATIENT
Start: 2025-02-03

## 2025-02-03 NOTE — PROGRESS NOTES
Assessment  1. Lumbar disc herniation  -     FL spine and pain procedure; Future; Expected date: 02/03/2025  -     gabapentin (NEURONTIN) 400 mg capsule; Take 1 capsule (400 mg total) by mouth 3 (three) times a day  2. Lumbar back pain with radiculopathy affecting right lower extremity  -     Ambulatory referral to Spine & Pain Management  -     FL spine and pain procedure; Future; Expected date: 02/03/2025  -     gabapentin (NEURONTIN) 400 mg capsule; Take 1 capsule (400 mg total) by mouth 3 (three) times a day      Ora is a pleasant 54-year-old female with history of morbid obesity right leg radicular pain in the distribution of 5 S1 with MRI findings consistent with L5-S1 disc herniation compressing the L5 and S1 nerve root.  She is failed conservative therapy including physical therapy and oral medications.  Will recommend a right L5-S1 S1 TFESI we will increase her gabapentin 400 mg 3 times daily.  Explained to patient to offload the back by going into the pool and doing pool exercises or recumbent bike.      My impressions and treatment recommendations were discussed in detail with the patient who verbalized understanding and had no further questions.  Discharge instructions were provided. I personally saw and examined the patient and I agree with the above discussed plan of care.    Plan      New Medications Ordered This Visit   Medications   • gabapentin (NEURONTIN) 400 mg capsule     Sig: Take 1 capsule (400 mg total) by mouth 3 (three) times a day     Dispense:  90 capsule     Refill:  3         Orders Placed This Encounter   Procedures   • FL spine and pain procedure     Standing Status:   Future     Expected Date:   2/3/2025     Expiration Date:   2/3/2029     Reason for Exam::   right L5-s1, S1 TFESI     Is the patient pregnant?:   No     Anticoagulant hold needed?:   none         History of Present Illness    Ora Veliz is a 54 y.o. female with relevant PMH of low back and right leg pain in  distribution of L5-S1 ongoing for the past 1 year aching throbbing in nature symptoms worse with walking exercising alleviated with rest beginning recent 8 out of 10, pending quality of life she is done physical therapy which has been helping her pain symptoms she takes gabapentin 3 to milligrams 3 times daily with improvement in her pain symptoms she stopped taking Percocet this is not effective MRI was reviewed with the patient no blood thinners no history of contrast allergies to contrast dye.  She is morbidly obese.        I have personally reviewed and/or updated the patient's past medical history, past surgical history, family history, social history, current medications, allergies, and vital signs today.     Review of Systems   Musculoskeletal:  Positive for arthralgias, joint swelling and myalgias.   All other systems reviewed and are negative.      Patient Active Problem List   Diagnosis   • Lumbar back pain with radiculopathy affecting right lower extremity   • Annual physical exam   • Morbid obesity with BMI of 50.0-59.9, adult (HCC)       Past Medical History:   Diagnosis Date   • Asthma    • Fibromyalgia        Past Surgical History:   Procedure Laterality Date   • HERNIA REPAIR         Family History   Problem Relation Age of Onset   • Depression Mother    • Thyroid disease Mother    • Diabetes Mother    • Hyperlipidemia Father        Social History     Occupational History   • Not on file   Tobacco Use   • Smoking status: Former     Average packs/day: 3.0 packs/day for 2.0 years (6.0 ttl pk-yrs)     Types: Cigarettes     Start date: 1989   • Smokeless tobacco: Never   Vaping Use   • Vaping status: Never Used   Substance and Sexual Activity   • Alcohol use: Not Currently   • Drug use: Not Currently   • Sexual activity: Not on file       Current Outpatient Medications on File Prior to Visit   Medication Sig   • acetaminophen (TYLENOL) 325 mg tablet Take 3 tablets (975 mg total) by mouth every 8 (eight)  "hours   • methocarbamol (ROBAXIN) 750 mg tablet Take 1 tablet (750 mg total) by mouth every 6 (six) hours   • [DISCONTINUED] gabapentin (NEURONTIN) 300 mg capsule Take 2 capsules (600 mg total) by mouth 3 (three) times a day   • oxyCODONE-acetaminophen (Percocet) 5-325 mg per tablet Take 1 tablet by mouth every 8 (eight) hours as needed for moderate pain Max Daily Amount: 3 tablets (Patient not taking: Reported on 2/3/2025)   • pantoprazole (PROTONIX) 40 mg tablet Take 1 tablet (40 mg total) by mouth daily in the early morning (Patient not taking: Reported on 2/3/2025)     No current facility-administered medications on file prior to visit.       Allergies   Allergen Reactions   • Pollen Extract Cough         Physical Exam    Ht 5' 8\" (1.727 m)   Wt (!) 177 kg (391 lb)   BMI 59.45 kg/m²         MSK:      Lumbar Spine:  No masses or atrophy,    Range of motion - Decreased extension/flexion  Palpation -  Tenderness to palpation in the lumbar parapsinals   PSIS tenderness no  Asad's/HOWARD no  Gaenslen's no  SLR positive right       Strength Right Left   Hip flexion L1,2 5 5   Knee extension L3 5 5   Ankle dorsiflexion L4 5 5   Great toe extension L5 4+_ 5   Ankle Plantarflexion S1 5 5       Sensory Exam:  intact to light touch bilateral LE       Reflexes:     Right Left   Biceps 2+ 2+   Triceps 2+ 2+   Brachioradialis 2+ 2+   Patellar 1+ 1+   Achilles 1+ 1+   Babinski neg neg        Gait antaglic                  Imaging    MRI L spine  IMPRESSION:     Inferiorly extruded disc herniation at L5-S1 on the right compressing and displacing the S1 nerve root posteriorly and laterally. Correlate for right S1 radiculopathy.     There is also mild right foraminal narrowing with disc material abutting the undersurface of the L5 nerve.     Mild annular bulging and facet arthropathy at the other lower thoracic and lumbar levels with mild canal stenosis and foraminal narrowing       "

## 2025-02-06 ENCOUNTER — OFFICE VISIT (OUTPATIENT)
Dept: PHYSICAL THERAPY | Facility: MEDICAL CENTER | Age: 55
End: 2025-02-06
Payer: COMMERCIAL

## 2025-02-06 DIAGNOSIS — M54.41 ACUTE BILATERAL LOW BACK PAIN WITH RIGHT-SIDED SCIATICA: Primary | ICD-10-CM

## 2025-02-06 DIAGNOSIS — M54.16 LUMBAR RADICULOPATHY: ICD-10-CM

## 2025-02-06 PROCEDURE — 97110 THERAPEUTIC EXERCISES: CPT

## 2025-02-06 PROCEDURE — 97530 THERAPEUTIC ACTIVITIES: CPT

## 2025-02-06 PROCEDURE — 97112 NEUROMUSCULAR REEDUCATION: CPT

## 2025-02-06 NOTE — PROGRESS NOTES
PT Re-Evaluation     Today's date: 2025  Patient name: Ora Veliz  : 1970  MRN: [id number]  Referring provider: Sherwin Redman DO  Dx:   Encounter Diagnosis     ICD-10-CM    1. Acute bilateral low back pain with right-sided sciatica  M54.41       2. Lumbar radiculopathy  M54.16           Start Time: 0915  Stop Time: 1015  Total time in clinic (min): 60 minutes    Assessment/Plan  Ora Veliz is a 54 y.o. female who was referred to physical therapy for management of acute R sided low back pain with medical dx of lumbar radiculopathy.  Primary impairments include report of severe pain that significantly limited ability to capture objective measurements.  Consequently, patient has difficulty completing ADLs including prolonged standing/ambulation, sitting, and completing all ADLs.  Ora would benefit from skilled intervention to address all deficits and improve functional capability.  Patient is a good candidate for therapy, pending compliance with HEP and consistent participation in physical therapy.  Thank you for the referral and please do not hesitate to contact me with any questions or concerns regarding Ora’s care!        Patient has completed 6 PT sessions to this date and has been progressing well.  Upon Re-Evaluation, the patient demonstrates improvements in thoracic/lumbar spine and hip ROM, pain, and activity tolerance. Patient continues to demonstrate deficits in pain free ROM and overall hip strength  therefore affecting the patient's quality of life and level of function, limiting the patient from walking extended periods of time, standing, squatting, lifting, pushing, pulling, carrying .  Patient was educated about thoracic/lumbar spine and hip progress thus far and how continued ROM and strengthening will further improve remaining limitations. HEP progressed and updated to reflect current patient function. Patient was educated about safety with exercises and educated on performing  correctly and safely.  All other questions and concerns were addressed.  I believe this patient will continue to benefit from skilled PT to address remaining deficits in thoracic/lumbar spine and hip ROM, strength, neuromuscular control, pain, and activity tolerance, improve activity tolerance, and quality of life.      Plan  Frequency: 2x per week   Duration in weeks: 12   POC start date: 2/6/25  POC end date: 5/1/2025  Therapeutic exercise/activity, neuromuscular reeducation, manual therapy, and modalities.   Patient understands and agrees to plan of care.    Goals  Short Term--4 weeks  1. Patient will report that pain is intermittent with 2 point decrease in pain at its worst. MET  2. Patient will demonstrate 1/2 point increase in all deficient MMT scores. MET  3. Patient will demonstrate ability to self-manage sx with HEP. MET    Long Term--By Discharge  1. Patient will achieve expected FOTO score.  2. Patient will report no limitation with ambulation secondary to lower back. PROGRESSING  3. Patient will report no sleep disturbance secondary to lower back. PROGRESSING  4. Patient will report no difficulty transitioning off of toilet/stepping into tub secondary to lower back. PROGRESSING  Patient's Goal: Get rid of the pain. Be able to do daily activities with trouble.       Subjective    History   Date of Onset:     12/1/24 Description: Insidious onset of coccyx pain (which she originally thought was an old injury acting up because of the weather) that progressively worsened and radiated superiorly across the lower back. After about a week since the pain first started she began to experience shooting pain down her R leg and her first toe went completely numb. She had her  take her to the [facility] ER on a Saturday afternoon, where CT scan was performed. Because of findings, it was decided that an MRI was needed, so patient had to be transferred to [facility]. MRI findings below, and patient was  "kept for observation until Monday.     2/6/25  Patient reports that she feels as though she has been getting better. She stated that her pain is pretty much at her worst when she is walking. She reports that she still has some trouble with getting into her jeep. She stated that she really has to think about how she is going to move her body to get into her car. She stated that stepping up is a little unsteady due to apprehension. She stated that she does do stairs one at a time. She stated that she was able to get about 5 hours of sleep last night, making it the most she has had in a long time. She stated that she was able to step into her shower a bit better today.        Symptoms  Constant R sided posterior leg pain that is aggravated with transitioning off of the toilet, stepping into the tub to shower, walking short distances, prolonged standing, and sitting on soft surfaces. Sx can radiate down to her ankle, and she still experiences intermittent numbness in her big toe.     Patient is currently sleeping on her couch because her bedroom is on the second floor but the bathroom is on the first floor. She has not attempted steps since being discharged home. She also tends to \"furniture surf\" when walking around her home because she is afraid of falling and the pain is often exacerbated.       Current Pain: 4/10  Pain at best: 3/10  Pain at worst: 8/10    Imaging-- 12/8/24 MRI Lumbar     IMPRESSION:  Inferiorly extruded disc herniation at L5-S1 on the right compressing and displacing the S1 nerve root posteriorly and laterally. Correlate for right S1 radiculopathy.  There is also mild right foraminal narrowing with disc material abutting the undersurface of the L5 nerve.  Mild annular bulging and facet arthropathy at the other lower thoracic and lumbar levels with mild canal stenosis and foraminal narrowing      Social History  Ora lives with her  in a multistory home with bedroom on second floor.  " "      Objective    Lumbar  % of normal   Flex. 100   Extn. 100   SB Left 100   SB Right 100   ROT Left 100   ROT Right 100   Repetitive testing: extension= sustained prone no worse   Flexion= worse        MMT         AROM          PROM    Hip       L       R        L           R      L     R   Flex. 4-/5 4-/5       Add. 5/5 5/5       Abd. 5/5 5/5               SI joint: Standing flex =   POS R SIJ hypomobility               Daily Treatment Diary     Precautions: universal.   HEP ACCESS CODE:   FOTO Completed On: 12/18/24    POC Expires Unit Limit Auth Expiration Date PT/OT/STVisit Limit   3/12/25 bomn N/a 90 PCY                     Auth Status DATE 12/18 12/23 12/30 1/24 1/30 2/6   90v PCY Visit # 1 2 3 4 5 6    Remaining 89 [phone] 85 84   MANUAL THERAPY         SIJ assess/treat   JAB                                                   THERAPEUTIC EXERCISE HEP         Sustained prone trial  Progression 15'  hold 5' with ppu on elbows 5' with ppu on elbows 10' with ppu   L hip into wall standing   10x5\" hold      Standing ext (hips into wall) Added 12/30  10x5\" 25x5\" throughout session      R hip into wall standing    2x10 5\"      Adduction     20x 5\"     Seated Hamstring Stretch      30\" 3x ea 30\" 3x ea   Quadriceps Stretch      30\" 3x ea 30\" 5x ea                                 Pt education/HEP  Pathology/centralization 15' 15' 10'     NEUROMUSCULAR REEDUCATION           Seated sciatic nerve sliders Added 12/30   2x10 B 2x10 B 2x10 B 2x10 B   HL PB     10x5\"      Standing PPT c glute iso    10x5\"      Adduction     20x 5\" 20x 5\" 20x 5\"   Hip Abduction     2x10 ea standing 2x10 ea standing 2x10 ea standing   Hip Extension     10x ea standing 2x10 ea standing 2x10 ea standing                                 THERAPEUTIC ACTIVITY          Reassessment of Function       10'                                 GAIT TRAINING                                                  MODALITIES                                "

## 2025-02-09 DIAGNOSIS — K21.9 GERD (GASTROESOPHAGEAL REFLUX DISEASE): ICD-10-CM

## 2025-02-10 RX ORDER — PANTOPRAZOLE SODIUM 40 MG/1
40 TABLET, DELAYED RELEASE ORAL
Qty: 30 TABLET | Refills: 5 | Status: SHIPPED | OUTPATIENT
Start: 2025-02-10

## 2025-02-14 ENCOUNTER — OFFICE VISIT (OUTPATIENT)
Dept: PHYSICAL THERAPY | Facility: MEDICAL CENTER | Age: 55
End: 2025-02-14
Payer: COMMERCIAL

## 2025-02-14 DIAGNOSIS — M54.16 LUMBAR RADICULOPATHY: ICD-10-CM

## 2025-02-14 DIAGNOSIS — M54.41 ACUTE BILATERAL LOW BACK PAIN WITH RIGHT-SIDED SCIATICA: Primary | ICD-10-CM

## 2025-02-14 PROCEDURE — 97112 NEUROMUSCULAR REEDUCATION: CPT

## 2025-02-14 PROCEDURE — 97530 THERAPEUTIC ACTIVITIES: CPT

## 2025-02-14 PROCEDURE — 97110 THERAPEUTIC EXERCISES: CPT

## 2025-02-14 NOTE — PROGRESS NOTES
"Daily Note     Today's date: 2025  Patient name: Ora Veliz  : 1970  MRN: [id number]  Referring provider: Sherwin Redman DO  Dx:   Encounter Diagnosis     ICD-10-CM    1. Acute bilateral low back pain with right-sided sciatica  M54.41       2. Lumbar radiculopathy  M54.16           Start Time: 0854  Stop Time: 0950  Total time in clinic (min): 56 minutes    Subjective: Patient stated the she has some leg cramps the other night. She reports that she has been working on keeping her core contracted throughout the day and it has helped.      Objective: See treatment diary below        Assessment: Patient tolerated treatment well. Patient continues to progress as tolerated. Patient with good tolerance to prone positioning. Patient demonstrated improvements in activity tolerance today. Educated on continuing exercises at the gym for overall strength, verbalized understanding. Patient demonstrated fatigue post treatment, demonstrated good technique on exercises and would continue to benefit from skilled PT.       Plan: Continue per plan of care.      Daily Treatment Diary     Precautions: universal.   HEP ACCESS CODE:   FOTO Completed On: 24    POC Expires Unit Limit Auth Expiration Date PT/OT/STVisit Limit   3/12/25 bomnb N/a 90 PCY                     Auth Status DATE  2/   90v PCY Visit # 7    5 6    Remaining 83    85 84   MANUAL THERAPY         SIJ assess/treat                                                      THERAPEUTIC EXERCISE HEP         Sustained prone trial 10' with ppu    5' with ppu on elbows 10' with ppu   L hip into wall standing          Standing ext (hips into wall) Added          R hip into wall standing          Adduction          Seated Hamstring Stretch  30\" 3x ea    30\" 3x ea 30\" 3x ea   Quadriceps Stretch  30\" 3x ea    30\" 3x ea 30\" 5x ea             NEUROMUSCULAR REEDUCATION           Seated sciatic nerve sliders Added      2x10 B 2x10 B   HL PB " "          Standing PPT c glute iso          Adduction      20x 5\" 20x 5\"   Hip Abduction  2x10 ea standing    2x10 ea standing 2x10 ea standing   Hip Extension  2x10 ea standing    2x10 ea standing 2x10 ea standing                                 THERAPEUTIC ACTIVITY          Reassessment of Function       10'                                 GAIT TRAINING                                                  MODALITIES                                  "

## 2025-02-21 ENCOUNTER — OFFICE VISIT (OUTPATIENT)
Dept: PHYSICAL THERAPY | Facility: MEDICAL CENTER | Age: 55
End: 2025-02-21
Payer: COMMERCIAL

## 2025-02-21 DIAGNOSIS — M54.16 LUMBAR RADICULOPATHY: ICD-10-CM

## 2025-02-21 DIAGNOSIS — M54.41 ACUTE BILATERAL LOW BACK PAIN WITH RIGHT-SIDED SCIATICA: Primary | ICD-10-CM

## 2025-02-21 PROCEDURE — 97110 THERAPEUTIC EXERCISES: CPT

## 2025-02-21 PROCEDURE — 97112 NEUROMUSCULAR REEDUCATION: CPT

## 2025-02-24 ENCOUNTER — TELEPHONE (OUTPATIENT)
Dept: NEUROSURGERY | Facility: CLINIC | Age: 55
End: 2025-02-24

## 2025-02-24 NOTE — TELEPHONE ENCOUNTER
2/28/25 - **3RD ATTEMPT** CALLED PT AND LMOM TO RESCHEDULE CX'D APT. LETTER SENT OUT.    2/26/25 - **2nd ATTEMPT** CALLED PT AND LMOM TO RESCHEDULE CX'D APT    2/24/25 - **FIRST ATTEMPT** CALLED PT AND LMOM TO RESCHEDULE CX'D APT    HOSP FOLLOW UP SHORT PG - 3/10/2025  Appointment Status: Canceled   Cancel Reason: Canceled via automated reminder system   Manpreet Levine PA-C   Department:  [facility]   Time: 10:45 AM   Length: 30 minutes

## 2025-02-28 ENCOUNTER — OFFICE VISIT (OUTPATIENT)
Dept: PHYSICAL THERAPY | Facility: MEDICAL CENTER | Age: 55
End: 2025-02-28
Payer: COMMERCIAL

## 2025-02-28 DIAGNOSIS — M54.41 ACUTE BILATERAL LOW BACK PAIN WITH RIGHT-SIDED SCIATICA: Primary | ICD-10-CM

## 2025-02-28 DIAGNOSIS — M54.16 LUMBAR RADICULOPATHY: ICD-10-CM

## 2025-02-28 PROCEDURE — 97112 NEUROMUSCULAR REEDUCATION: CPT

## 2025-02-28 PROCEDURE — 97110 THERAPEUTIC EXERCISES: CPT

## 2025-02-28 NOTE — PROGRESS NOTES
"Daily Note     Today's date: 2025  Patient name: Ora Veliz  : 1970  MRN: [id number]  Referring provider: Sherwin Redman DO  Dx:   Encounter Diagnosis     ICD-10-CM    1. Acute bilateral low back pain with right-sided sciatica  M54.41       2. Lumbar radiculopathy  M54.16           Start Time: 0900  Stop Time: 0950  Total time in clinic (min): 50 minutes    Subjective: Patient reports that she has continued LBP but is noticing small improvements in daily function; IE: standing straighter to do dishes, sleeping on her back, ascending/descending stairs at daughter's apartment easier and walking more.       Objective: See treatment diary below      Assessment: No changes to program this session. Patient appears to tolerate treatment well. Spoke at length with patient about possible Lipedema in LE. Directed her to website for information ([email or web address]) as well as possibility of attending PT for formal Lymphedema treatment. Patient demonstrated fatigue post treatment, exhibited good technique with therapeutic exercises, and would benefit from continued PT      Plan: Continue per plan of care.  Progress treatment as tolerated.       Daily Treatment Diary     Precautions: universal.   HEP ACCESS CODE:   FOTO Completed On: 24    POC Expires Unit Limit Auth Expiration Date PT/OT/STVisit Limit   3/12/25 bomnb N/a 90 PCY                     Auth Status DATE    90v PCY Visit # 7 8 9  5 6    Remaining [phone]  85 84   MANUAL THERAPY         SIJ assess/treat                                                      THERAPEUTIC EXERCISE HEP         Sustained prone trial 10' with ppu 10' wth ppu 10' with PPU  5' with ppu on elbows 10' with ppu   L hip into wall standing          Standing ext (hips into wall) Added          R hip into wall standing          Adduction          Seated Hamstring Stretch  30\" 3x ea 30\" 3x ea 30\"x3 ea  30\" 3x ea 30\" 3x ea   Quadriceps Stretch  30\" 3x ea 30\" " "3x ea 30\"x3 ea  30\" 3x ea 30\" 5x ea             NEUROMUSCULAR REEDUCATION           Seated sciatic nerve sliders Added 12/30   At home  2x10 B 2x10 B   HL PB           Standing PPT c glute iso          Adduction      20x 5\" 20x 5\"   Hip Abduction  2x10 ea standing 2x10 ea standing 2x10 ea standing with TA  2x10 ea standing 2x10 ea standing   Hip Extension  2x10 ea standing 2x10 ea standing 2x10 ea standing with TA  2x10 ea standing 2x10 ea standing   TA Ball Press Downs   20x 5\" 5\"x20                          THERAPEUTIC ACTIVITY          Reassessment of Function       10'                                 GAIT TRAINING                                                  MODALITIES                                    "

## 2025-03-03 ENCOUNTER — TELEPHONE (OUTPATIENT)
Age: 55
End: 2025-03-03

## 2025-03-03 NOTE — TELEPHONE ENCOUNTER
Caller: Patient     Doctor: Dr. Baltazar    Reason for call: Patient is sick and needs to cancel upcoming procedure.    Will call back to reschedule     Call back#: [phone]

## 2025-03-07 ENCOUNTER — OFFICE VISIT (OUTPATIENT)
Dept: PHYSICAL THERAPY | Facility: MEDICAL CENTER | Age: 55
End: 2025-03-07
Payer: COMMERCIAL

## 2025-03-07 DIAGNOSIS — M54.16 LUMBAR RADICULOPATHY: ICD-10-CM

## 2025-03-07 DIAGNOSIS — R42 DIZZINESS: ICD-10-CM

## 2025-03-07 DIAGNOSIS — M54.41 ACUTE BILATERAL LOW BACK PAIN WITH RIGHT-SIDED SCIATICA: Primary | ICD-10-CM

## 2025-03-07 PROCEDURE — 97110 THERAPEUTIC EXERCISES: CPT

## 2025-03-07 PROCEDURE — 97112 NEUROMUSCULAR REEDUCATION: CPT

## 2025-03-07 NOTE — PROGRESS NOTES
"Daily Note     Today's date: 3/7/2025  Patient name: Ora Veliz  : 1970  MRN: [id number]  Referring provider: Sherwin Redman DO  Dx:   Encounter Diagnosis     ICD-10-CM    1. Acute bilateral low back pain with right-sided sciatica  M54.41       2. Lumbar radiculopathy  M54.16       3. Dizziness  R42           Start Time: 0950  Stop Time: 1040  Total time in clinic (min): 50 minutes    Subjective: Patient reports taht she is not too bad today. She stated that she is having a little pain in her knee and a little back pain across her back.      Objective: See treatment diary below      Assessment: Patient tolerated treatment well. Patient demonstrated fatigue post treatment, exhibited good technique with therapeutic exercises, and would benefit from continued PT. Added Ball rollouts today for mobility, tolerated well. Completed paloff presses today for added stability, tolerated well.      Plan: Continue per plan of care.  Progress treatment as tolerated.       Daily Treatment Diary     Precautions: universal.   HEP ACCESS CODE:   FOTO Completed On: 24    POC Expires Unit Limit Auth Expiration Date PT/OT/STVisit Limit   3/12/25 bomnb N/a 90 PCY                     Auth Status DATE 2/14 2/21 2/28 3/7     90v PCY Visit # 7 8 9 10      Remaining [phone]     MANUAL THERAPY         SIJ assess/treat                                                      THERAPEUTIC EXERCISE HEP         Sustained prone trial 10' with ppu 10' wth ppu 10' with PPU 10' w/ ppu     L hip into wall standing          Standing ext (hips into wall) Added 12/         R hip into wall standing          Adduction          Seated Hamstring Stretch  30\" 3x ea 30\" 3x ea 30\"x3 ea 30\" 3x ea     Quadriceps Stretch  30\" 3x ea 30\" 3x ea 30\"x3 ea 30\" 3x ea     Ball Rollouts     10x ea fwd/lat               NEUROMUSCULAR REEDUCATION           Seated sciatic nerve sliders Added 12/30   At home      HL PB           Standing PPT c glute iso  " "        Adduction          Hip Abduction  2x10 ea standing 2x10 ea standing 2x10 ea standing with TA 2x10 ea standing with TA     Hip Extension  2x10 ea standing 2x10 ea standing 2x10 ea standing with TA 2x10 ea standing with TA     TA Ball Press Downs   20x 5\" 5\"x20 20x 5\"     Paloff Press     15x ea GTB                         THERAPEUTIC ACTIVITY          Reassessment of Function                                        GAIT TRAINING                                                  MODALITIES                                    "

## 2025-03-14 ENCOUNTER — OFFICE VISIT (OUTPATIENT)
Dept: PHYSICAL THERAPY | Facility: MEDICAL CENTER | Age: 55
End: 2025-03-14
Payer: COMMERCIAL

## 2025-03-14 DIAGNOSIS — M54.41 ACUTE BILATERAL LOW BACK PAIN WITH RIGHT-SIDED SCIATICA: Primary | ICD-10-CM

## 2025-03-14 DIAGNOSIS — M54.16 LUMBAR RADICULOPATHY: ICD-10-CM

## 2025-03-14 PROCEDURE — 97112 NEUROMUSCULAR REEDUCATION: CPT

## 2025-03-14 PROCEDURE — 97140 MANUAL THERAPY 1/> REGIONS: CPT

## 2025-03-14 PROCEDURE — 97110 THERAPEUTIC EXERCISES: CPT

## 2025-03-14 NOTE — PROGRESS NOTES
"Daily Note     Today's date: 3/14/2025  Patient name: Ora Veliz  : 1970  MRN: [id number]  Referring provider: Sherwin Redman DO  Dx:   Encounter Diagnosis     ICD-10-CM    1. Acute bilateral low back pain with right-sided sciatica  M54.41       2. Lumbar radiculopathy  M54.16           Start Time: 0948  Stop Time: 1045  Total time in clinic (min): 57 minutes    Subjective: Patient reports that she is a little sore in her back today. She reports that she was not too sore after last session. She reports that it is more generalized on the L side.      Objective: See treatment diary below      Assessment: Patient tolerated treatment well. Patient demonstrated fatigue post treatment, exhibited good technique with therapeutic exercises, and would benefit from continued PT. Patients SIJ assessed and treated with MET today, noted improvements in pain following. Patient able to increase resistance on paloff press today.      Plan: Continue per plan of care.  Progress treatment as tolerated.       Daily Treatment Diary     Precautions: universal.   HEP ACCESS CODE:   FOTO Completed On: 24    POC Expires Unit Limit Auth Expiration Date PT/OT/STVisit Limit   3/12/25 bomnb N/a 90 PCY                     Auth Status DATE 2/14 2/21 2/28 3/7 3/14    90v PCY Visit # 7 8 9 10 11     Remaining [phone] 79    MANUAL THERAPY         SIJ assess/treat                                                      THERAPEUTIC EXERCISE HEP         Sustained prone trial 10' with ppu 10' wth ppu 10' with PPU 10' w/ ppu 5' w/ ppu    L hip into wall standing          Standing ext (hips into wall) Added          R hip into wall standing          Adduction          Seated Hamstring Stretch  30\" 3x ea 30\" 3x ea 30\"x3 ea 30\" 3x ea 30\" 3x ea    Quadriceps Stretch  30\" 3x ea 30\" 3x ea 30\"x3 ea 30\" 3x ea 30\" 3x ea    Ball Rollouts     10x ea fwd/lat 10x ea fwd/lat              NEUROMUSCULAR REEDUCATION           Seated sciatic nerve " "sliders Added 12/30   At home      HL PB           Standing PPT c glute iso          Adduction      20x 5\"    Hip Abduction  2x10 ea standing 2x10 ea standing 2x10 ea standing with TA 2x10 ea standing with TA 2x10 ea standing with TA    Hip Extension  2x10 ea standing 2x10 ea standing 2x10 ea standing with TA 2x10 ea standing with TA 2x10 ea standing with TA    TA Ball Press Downs   20x 5\" 5\"x20 20x 5\" 20x 5\"    Paloff Press     15x ea GTB 15x ea BTB                        THERAPEUTIC ACTIVITY          Reassessment of Function                                        GAIT TRAINING                                                  MODALITIES                                    "

## 2025-03-21 ENCOUNTER — OFFICE VISIT (OUTPATIENT)
Dept: PHYSICAL THERAPY | Facility: MEDICAL CENTER | Age: 55
End: 2025-03-21
Payer: COMMERCIAL

## 2025-03-21 DIAGNOSIS — M54.41 ACUTE BILATERAL LOW BACK PAIN WITH RIGHT-SIDED SCIATICA: Primary | ICD-10-CM

## 2025-03-21 DIAGNOSIS — M54.16 LUMBAR RADICULOPATHY: ICD-10-CM

## 2025-03-21 PROCEDURE — 97112 NEUROMUSCULAR REEDUCATION: CPT

## 2025-03-21 PROCEDURE — 97110 THERAPEUTIC EXERCISES: CPT

## 2025-03-21 NOTE — PROGRESS NOTES
"Daily Note     Today's date: 3/21/2025  Patient name: Ora Veliz  : 1970  MRN: [id number]  Referring provider: Sherwin Redman DO  Dx:   Encounter Diagnosis     ICD-10-CM    1. Acute bilateral low back pain with right-sided sciatica  M54.41       2. Lumbar radiculopathy  M54.16           Start Time: 0955  Stop Time: 1043  Total time in clinic (min): 48 minutes    Subjective: Patient reports that the pain down her legs has been pretty good. She stated that she only really has pain going to her car and then getting into her car.      Objective: See treatment diary below      Assessment: Patient tolerated treatment well. Patient demonstrated fatigue post treatment, exhibited good technique with therapeutic exercises, and would benefit from continued PT. Patient able to complete exercises without increase in pain. Patient continues to progress with core strengthening.      Plan: Continue per plan of care.  Progress treatment as tolerated.       Daily Treatment Diary     Precautions: universal.   HEP ACCESS CODE:   FOTO Completed On: 24    POC Expires Unit Limit Auth Expiration Date PT/OT/STVisit Limit   3/12/25 bomnb N/a 90 PCY                     Auth Status DATE 2/14 2/21 2/28 3/7 3/14 3/21   90v PCY Visit # 7 8 9 10 11 12    Remaining 83 [phone] 78   MANUAL THERAPY         SIJ assess/treat     IB                                                 THERAPEUTIC EXERCISE HEP         Sustained prone trial 10' with ppu 10' wth ppu 10' with PPU 10' w/ ppu 5' w/ ppu 10' w/ ppu   L hip into wall standing          Standing ext (hips into wall) Added          R hip into wall standing          Adduction          Seated Hamstring Stretch  30\" 3x ea 30\" 3x ea 30\"x3 ea 30\" 3x ea 30\" 3x ea 10x 10\" ea   Quadriceps Stretch  30\" 3x ea 30\" 3x ea 30\"x3 ea 30\" 3x ea 30\" 3x ea 30\" 3x ea   Ball Rollouts     10x ea fwd/lat 10x ea fwd/lat 10x ea fwd/lat             NEUROMUSCULAR REEDUCATION           Seated sciatic nerve " "sliders Added 12/30   At home      HL PB           Standing PPT c glute iso          Adduction      20x 5\" 20x 5\"   Hip Abduction  2x10 ea standing 2x10 ea standing 2x10 ea standing with TA 2x10 ea standing with TA 2x10 ea standing with TA 2x10 ea standing with TA   Hip Extension  2x10 ea standing 2x10 ea standing 2x10 ea standing with TA 2x10 ea standing with TA 2x10 ea standing with TA 2x10 standing with TA   TA Ball Press Downs   20x 5\" 5\"x20 20x 5\" 20x 5\" 20x 5\"   Paloff Press     15x ea GTB 15x ea BTB 15x ea BTB   TA Activation          TA Marches        20x ea             THERAPEUTIC ACTIVITY          Reassessment of Function                                        GAIT TRAINING                                                  MODALITIES                                    "

## 2025-03-24 ENCOUNTER — OFFICE VISIT (OUTPATIENT)
Dept: PHYSICAL THERAPY | Facility: MEDICAL CENTER | Age: 55
End: 2025-03-24
Payer: COMMERCIAL

## 2025-03-24 ENCOUNTER — OFFICE VISIT (OUTPATIENT)
Dept: NEUROSURGERY | Facility: CLINIC | Age: 55
End: 2025-03-24
Payer: COMMERCIAL

## 2025-03-24 VITALS
OXYGEN SATURATION: 95 % | HEIGHT: 68 IN | TEMPERATURE: 97.4 F | DIASTOLIC BLOOD PRESSURE: 72 MMHG | BODY MASS INDEX: 59.45 KG/M2 | HEART RATE: 80 BPM | SYSTOLIC BLOOD PRESSURE: 130 MMHG

## 2025-03-24 DIAGNOSIS — E66.01 MORBID OBESITY WITH BMI OF 50.0-59.9, ADULT (HCC): ICD-10-CM

## 2025-03-24 DIAGNOSIS — M54.41 ACUTE BILATERAL LOW BACK PAIN WITH RIGHT-SIDED SCIATICA: Primary | ICD-10-CM

## 2025-03-24 DIAGNOSIS — M54.16 LUMBAR RADICULOPATHY: ICD-10-CM

## 2025-03-24 DIAGNOSIS — M54.16 LUMBAR BACK PAIN WITH RADICULOPATHY AFFECTING RIGHT LOWER EXTREMITY: Primary | ICD-10-CM

## 2025-03-24 PROCEDURE — 97110 THERAPEUTIC EXERCISES: CPT

## 2025-03-24 PROCEDURE — 97112 NEUROMUSCULAR REEDUCATION: CPT

## 2025-03-24 PROCEDURE — 97530 THERAPEUTIC ACTIVITIES: CPT

## 2025-03-24 PROCEDURE — 99215 OFFICE O/P EST HI 40 MIN: CPT | Performed by: PHYSICIAN ASSISTANT

## 2025-03-24 NOTE — PROGRESS NOTES
PT Re-Evaluation     Today's date: 3/24/2025  Patient name: Ora Veliz  : 1970  MRN: [id number]  Referring provider: Sherwin Redman DO  Dx:   Encounter Diagnosis     ICD-10-CM    1. Acute bilateral low back pain with right-sided sciatica  M54.41       2. Lumbar radiculopathy  M54.16           Start Time: 1010  Stop Time: 1103  Total time in clinic (min): 53 minutes    Assessment/Plan  Ora Veliz is a 54 y.o. female who was referred to physical therapy for management of acute R sided low back pain with medical dx of lumbar radiculopathy.  Primary impairments include report of severe pain that significantly limited ability to capture objective measurements.  Consequently, patient has difficulty completing ADLs including prolonged standing/ambulation, sitting, and completing all ADLs.  Ora would benefit from skilled intervention to address all deficits and improve functional capability.  Patient is a good candidate for therapy, pending compliance with HEP and consistent participation in physical therapy.  Thank you for the referral and please do not hesitate to contact me with any questions or concerns regarding Ora’s care!      3/24/2025  Patient has completed multiple PT sessions to this date and has been progressing well.  Upon Re-Evaluation, the patient demonstrates improvements in hip strength. Patient continues to demonstrate deficits in core stability as well as ativity tolerance  therefore affecting the patient's quality of life and level of function, limiting the patient from standing and walking for extended periods of time, getting in and out of her car .  Patient was educated about thoracic/lumbar spine and hip progress thus far and how continued ROM and strengthening will further improve remaining limitations. HEP progressed and updated to reflect current patient function. Patient was educated about safety with exercises and educated on performing correctly and safely.  All other questions and  concerns were addressed.  I believe this patient will continue to benefit from skilled PT to address remaining deficits in thoracic/lumbar spine and hip ROM, strength, neuromuscular control, pain, and activity tolerance, improve activity tolerance, and quality of life.        Plan  Frequency: 2x per week   Duration in weeks: 12   POC start date: 3/24/2025  POC end date: 6/18//2025  Therapeutic exercise/activity, neuromuscular reeducation, manual therapy, and modalities.   Patient understands and agrees to plan of care.    Goals  Short Term--4 weeks  1. Patient will report that pain is intermittent with 2 point decrease in pain at its worst. MET  2. Patient will demonstrate 1/2 point increase in all deficient MMT scores. MET  3. Patient will demonstrate ability to self-manage sx with HEP. MET    Long Term--By Discharge  1. Patient will achieve expected FOTO score.  2. Patient will report no limitation with ambulation secondary to lower back. PROGRESSING  3. Patient will report no sleep disturbance secondary to lower back. PROGRESSING  4. Patient will report no difficulty transitioning off of toilet/stepping into tub secondary to lower back. PROGRESSING  Patient's Goal: Get rid of the pain. Be able to do daily activities with trouble.       Subjective    History   Date of Onset:     12/1/24 Description: Insidious onset of coccyx pain (which she originally thought was an old injury acting up because of the weather) that progressively worsened and radiated superiorly across the lower back. After about a week since the pain first started she began to experience shooting pain down her R leg and her first toe went completely numb. She had her  take her to the [facility] ER on a Saturday afternoon, where CT scan was performed. Because of findings, it was decided that an MRI was needed, so patient had to be transferred to [facility]. MRI findings below, and patient was kept for observation until Monday.        3/24/2025  Patient stated that she was able to go to the gym twice over the weekend. She stated that she was sore afterwards. She reports that she is still having some pain. She reports that she has had some improvements, bt still having some pain. Patient reports that she has not had the pain down her leg for quite some time. She reports taht her worst pain was when she was waking up in the morning      Current Pain: 6/10  Pain at best: 0/10  Pain at worst: 7.5/10    Imaging-- 12/8/24 MRI Lumbar     IMPRESSION:  Inferiorly extruded disc herniation at L5-S1 on the right compressing and displacing the S1 nerve root posteriorly and laterally. Correlate for right S1 radiculopathy.  There is also mild right foraminal narrowing with disc material abutting the undersurface of the L5 nerve.  Mild annular bulging and facet arthropathy at the other lower thoracic and lumbar levels with mild canal stenosis and foraminal narrowing      Social History  Ora lives with her  in a multistory home with bedroom on second floor.        Objective    Lumbar  % of normal   Flex. 100   Extn. 100   SB Left 100   SB Right 100   ROT Left 100   ROT Right 100   Repetitive testing: extension= sustained prone no worse   Flexion= worse        MMT         AROM          PROM    Hip       L       R        L           R      L     R   Flex. 4/5 4/5       Add. 5/5 5/5       Abd. 5/5 5/5               SI joint: Standing flex =   POS R SIJ hypomobility               Daily Treatment Diary     Precautions: universal.   HEP ACCESS CODE:   FOTO Completed On: 12/18/24    POC Expires Unit Limit Auth Expiration Date PT/OT/STVisit Limit   3/12/25 bomn N/a 90 PCY                   PT 1:1 10:  Auth Status DATE 3/24    3/14 3/21   90v PCY Visit # 13    11 12    Remaining     79 78   MANUAL THERAPY         SIJ assess/treat     IB                                                 THERAPEUTIC EXERCISE HEP         Sustained prone trial 10' w/ ppu     "5' w/ ppu 10' w/ ppu   L hip into wall standing          Standing ext (hips into wall) Added 12/30         R hip into wall standing          Adduction          Seated Hamstring Stretch  30\" 3x ea    30\" 3x ea 10x 10\" ea   Quadriceps Stretch  30\" 3x ea    30\" 3x ea 30\" 3x ea   Ball Rollouts      10x ea fwd/lat 10x ea fwd/lat   Back Extensions  10x with ball at wall                  NEUROMUSCULAR REEDUCATION           Seated sciatic nerve sliders Added 12/30         HL PB           Standing PPT c glute iso          Adduction  20x 5\"    20x 5\" 20x 5\"   Hip Abduction      2x10 ea standing with TA 2x10 ea standing with TA   Hip Extension      2x10 ea standing with TA 2x10 standing with TA   TA Ball Press Downs      20x 5\" 20x 5\"   Paloff Press      15x ea BTB 15x ea BTB   TA Activation          TA Marches   20x ea     20x ea             THERAPEUTIC ACTIVITY          Reassessment of Function  10'                                      GAIT TRAINING                                                  MODALITIES                                  "

## 2025-03-24 NOTE — ASSESSMENT & PLAN NOTE
Presents for 3 month follow up to discuss her low back and RLE pain   Has been doing PT since hospital discharge with some improvement   Saw PM in February but has not undergone injection yet     Imaging:   MRI lumbar spine 12/8/2025: Inferiorly extruded disc herniation contacting the R S1 nerve root with displacement posteriorly.    Plan:   Continue to monitor symptoms   Reviewed imaging with patient in room  L5S1 disc extrusion displacing the descending S1 nerve root.  Continue taking pain medication as prescribed  Follows with PM  Continue PT as tolerated  Continue to be active with walking and low impact activities   Follow up if symptoms worsen or patient again develops new radicular features that have resolved. Encouraged to call with questions or concerns

## 2025-03-24 NOTE — ASSESSMENT & PLAN NOTE
BMI 59   Patient returning to gym with gradual increase in activity  Prior to injury was losing weight with physical activity  Motivated to continue with weight loss journey

## 2025-03-24 NOTE — PROGRESS NOTES
Name: Ora Veliz      : 1970      MRN: [id number]  Encounter Provider: Manpreet Levine PA-C  Encounter Date: 3/24/2025   Encounter department: [facility]  :  Assessment & Plan  Lumbar back pain with radiculopathy affecting right lower extremity  Presents for 3 month follow up to discuss her low back and RLE pain   Has been doing PT since hospital discharge with some improvement   Saw PM in February but has not undergone injection yet     Imaging:   MRI lumbar spine 2025: Inferiorly extruded disc herniation contacting the R S1 nerve root with displacement posteriorly.    Plan:   Continue to monitor symptoms   Reviewed imaging with patient in room  L5S1 disc extrusion displacing the descending S1 nerve root.  Continue taking pain medication as prescribed  Follows with PM  Continue PT as tolerated  Continue to be active with walking and low impact activities   Follow up if symptoms worsen or patient again develops new radicular features that have resolved. Encouraged to call with questions or concerns       Morbid obesity with BMI of 50.0-59.9, adult (HCC)  BMI 59   Patient returning to gym with gradual increase in activity  Prior to injury was losing weight with physical activity  Motivated to continue with weight loss journey           History of Present Illness     Ora Veliz is a 54 y.o. female who presents to the outpatient neurosurgical office for follow-up regarding her low back pain and right lower extremity pain.  Patient was seen in the hospital when she had sudden onset of the symptoms a few months ago.  She was recommended undergo conservative measures as she was also not interested in surgical intervention at that time.  Since discharge from hospital patient has been regimented with her physical therapy.  She has recently been cleared to return to full activity at the gym which she completed over the weekend.  Patient states since return to the gym she does  not have any exacerbation of her pains.  Her primary pain is across her low back just above her coccyx/sacrum.  She no longer has her right leg radiculopathy.  When she experiences the pain she tends to hold her arm across her low back which prompts her to stand slightly more upright.  She does have some difficulty with ambulating distances secondary to some fatigue and weakness in the legs.  Denies any numbness/tingling.  Patient does report some good results with physical therapy and is optimistic for continued benefits.  Due to her subjective improvements she did not proceed with her lumbar epidural steroid injection.         Review of Systems   Constitutional: Negative.    HENT: Negative.     Eyes: Negative.    Respiratory: Negative.     Cardiovascular: Negative.    Gastrointestinal: Negative.    Endocrine: Negative.    Genitourinary: Negative.  Negative for frequency and urgency.   Musculoskeletal:  Positive for back pain and gait problem. Negative for myalgias.        3 MON HF/U   Lumbar radiculopathy    MRI Lspine in 12/8/24  CT Lspine on 12/7/24    7/10 LBP Non radiating on going   PM- 2/3/25  AMBER ordered not scheduled    No ac/Former smoker/No previous back sx      Skin: Negative.    Allergic/Immunologic: Negative.    Hematological: Negative.    Psychiatric/Behavioral: Negative.  Negative for sleep disturbance.    All other systems reviewed and are negative.    I have personally reviewed the MA's review of systems and made changes as necessary.    Past Medical History   Past Medical History:   Diagnosis Date    Asthma     Fibromyalgia      Past Surgical History:   Procedure Laterality Date    HERNIA REPAIR       Family History   Problem Relation Age of Onset    Depression Mother     Thyroid disease Mother     Diabetes Mother     Hyperlipidemia Father      she reports that she has quit smoking. Her smoking use included cigarettes. She started smoking about 36 years ago. She has a 6 pack-year smoking history.  "She has never used smokeless tobacco. She reports that she does not currently use alcohol. She reports that she does not currently use drugs.  Current Outpatient Medications   Medication Instructions    acetaminophen (TYLENOL) 975 mg, Oral, Every 8 hours scheduled    gabapentin (NEURONTIN) 400 mg, Oral, 3 times daily    methocarbamol (ROBAXIN) 750 mg, Oral, Every 6 hours scheduled    oxyCODONE-acetaminophen (Percocet) 5-325 mg per tablet 1 tablet, Oral, Every 8 hours PRN    pantoprazole (PROTONIX) 40 mg, Oral, Daily (early morning)     Allergies   Allergen Reactions    Pollen Extract Cough      Objective   /72 (Patient Position: Sitting, Cuff Size: Large)   Pulse 80   Temp (!) 97.4 °F (36.3 °C) (Temporal)   Ht 5' 8\" (1.727 m)   SpO2 95%   BMI 59.45 kg/m²     Physical Exam  Constitutional:       Appearance: Normal appearance. She is obese.   HENT:      Head: Normocephalic and atraumatic.   Eyes:      Extraocular Movements: Extraocular movements intact.   Cardiovascular:      Rate and Rhythm: Normal rate.   Pulmonary:      Effort: Pulmonary effort is normal.   Musculoskeletal:         General: Normal range of motion.      Cervical back: Normal range of motion.   Skin:     General: Skin is warm and dry.   Neurological:      Mental Status: She is alert and oriented to person, place, and time.      Cranial Nerves: No cranial nerve deficit.      Sensory: No sensory deficit.      Motor: No weakness.   Psychiatric:         Mood and Affect: Mood normal.         Behavior: Behavior normal.         Thought Content: Thought content normal.     Radiology Results Review: I personally reviewed the following image studies in PACS and associated radiology reports: MRI spine. My interpretation of the radiology images/reports is: See above.    Administrative Statements   I have spent a total time of 45 minutes in caring for this patient on the day of the visit/encounter including Diagnostic results, Prognosis, Risks and " benefits of tx options, Instructions for management, Patient and family education, Importance of tx compliance, Risk factor reductions, Impressions, Counseling / Coordination of care, Documenting in the medical record, Reviewing/placing orders in the medical record (including tests, medications, and/or procedures), Obtaining or reviewing history  , and Communicating with other healthcare professionals .

## 2025-04-04 ENCOUNTER — OFFICE VISIT (OUTPATIENT)
Dept: PHYSICAL THERAPY | Facility: MEDICAL CENTER | Age: 55
End: 2025-04-04
Payer: COMMERCIAL

## 2025-04-04 DIAGNOSIS — M54.41 ACUTE BILATERAL LOW BACK PAIN WITH RIGHT-SIDED SCIATICA: Primary | ICD-10-CM

## 2025-04-04 DIAGNOSIS — M54.16 LUMBAR RADICULOPATHY: ICD-10-CM

## 2025-04-04 PROCEDURE — 97110 THERAPEUTIC EXERCISES: CPT

## 2025-04-04 PROCEDURE — 97112 NEUROMUSCULAR REEDUCATION: CPT

## 2025-04-04 NOTE — PROGRESS NOTES
"Daily Note     Today's date: 2025  Patient name: Ora Veliz  : 1970  MRN: [id number]  Referring provider: Sherwin Redman DO  Dx:   Encounter Diagnosis     ICD-10-CM    1. Acute bilateral low back pain with right-sided sciatica  M54.41       2. Lumbar radiculopathy  M54.16           Start Time: 1135  Stop Time: 1230  Total time in clinic (min): 55 minutes    Subjective: Patient reports that she was able to get to the gym 3 times last week. No pain down the legs She still has some hip and glute pain.      Objective: See treatment diary below      Assessment: Patient tolerated treatment well. Patient demonstrated fatigue post treatment, exhibited good technique with therapeutic exercises, and would benefit from continued PT. Patient with some LBP with TA activation, improved over time. Patient able to progress with core strengthening as tolerated.      Plan: Continue per plan of care.  Progress treatment as tolerated.       Daily Treatment Diary     Precautions: universal.   HEP ACCESS CODE:   FOTO Completed On: 24    POC Expires Unit Limit Auth Expiration Date PT/OT/STVisit Limit   3/12/25 bomn N/a 90 PCY                   PT 1:1 4221-8670  Auth Status DATE 3/24 4/4       90v PCY Visit # 13 14        Remaining         MANUAL THERAPY         SIJ assess/treat                                                      THERAPEUTIC EXERCISE HEP         Sustained prone trial 10' w/ ppu 10\" w/ ppu       L hip into wall standing          Standing ext (hips into wall) Added 12/30         R hip into wall standing          Seated Hamstring Stretch  30\" 3x ea 30\" 3x ea       Quadriceps Stretch  30\" 3x ea 30 3x ea       Ball Rollouts          Back Extensions  10x with ball at wall 10x with ball at wall                           NEUROMUSCULAR REEDUCATION           Seated sciatic nerve sliders Added 12/30         HL PB           Standing PPT c glute iso          Adduction  20x 5\" 20x 5\"       Hip Abduction   " "2x10       Hip Extension   2x10       TA Ball Press Downs          Paloff Press   15x ea BTB       TA Activation   20x 5\"       TA Marches   20x ea 20x ea       LAQ   20x 5#                 THERAPEUTIC ACTIVITY          Reassessment of Function  10'                                      GAIT TRAINING                                                  MODALITIES                                      "

## 2025-04-11 ENCOUNTER — OFFICE VISIT (OUTPATIENT)
Dept: PHYSICAL THERAPY | Facility: MEDICAL CENTER | Age: 55
End: 2025-04-11
Payer: COMMERCIAL

## 2025-04-11 DIAGNOSIS — M54.41 ACUTE BILATERAL LOW BACK PAIN WITH RIGHT-SIDED SCIATICA: Primary | ICD-10-CM

## 2025-04-11 DIAGNOSIS — M54.16 LUMBAR RADICULOPATHY: ICD-10-CM

## 2025-04-11 PROCEDURE — 97112 NEUROMUSCULAR REEDUCATION: CPT

## 2025-04-11 PROCEDURE — 97110 THERAPEUTIC EXERCISES: CPT

## 2025-04-11 NOTE — PROGRESS NOTES
"Daily Note     Today's date: 2025  Patient name: Ora Veliz  : 1970  MRN: [id number]  Referring provider: Sherwin Redman DO  Dx:   Encounter Diagnosis     ICD-10-CM    1. Acute bilateral low back pain with right-sided sciatica  M54.41       2. Lumbar radiculopathy  M54.16           Start Time: 0940  Stop Time: 1026  Total time in clinic (min): 46 minutes    Subjective: Patient reports that she is having some annoying pain today. Still no pain down legs. She reports that she is having hip pain at night. She reports that she is a side sleeper. She reports she got to the gym Saturday, , Tuesday, Thursday.      Objective: See treatment diary below      Assessment: Patient tolerated treatment well. Patient demonstrated fatigue post treatment, exhibited good technique with therapeutic exercises, and would benefit from continued PT. Patient educated on proper leg press technique today to minimize lumbar flexion during movement.       Plan: Continue per plan of care.  Progress treatment as tolerated.       Daily Treatment Diary     Precautions: universal.   HEP ACCESS CODE:   FOTO Completed On: 24    POC Expires Unit Limit Auth Expiration Date PT/OT/STVisit Limit   3/12/25 bomn N/a 90 PCY                   PT 1:1 entire time  Auth Status DATE 3/24 4/4 4/11      90v PCY Visit # 13 14 15       Remaining         MANUAL THERAPY         SIJ assess/treat                                                      THERAPEUTIC EXERCISE HEP         Sustained prone trial 10' w/ ppu 10\" w/ ppu 10' w/ ppu      L hip into wall standing          Standing ext (hips into wall) Added 12         R hip into wall standing          Seated Hamstring Stretch  30\" 3x ea 30\" 3x ea 30\" 3x ea      Quadriceps Stretch  30\" 3x ea 30 3x ea 30\" 3x ea      Ball Rollouts          Back Extensions  10x with ball at wall 10x with ball at wall       Heel/Toe Raises    20x ea                NEUROMUSCULAR REEDUCATION           Seated " "sciatic nerve sliders Added 12/30         HL PB           Standing PPT c glute iso          Adduction  20x 5\" 20x 5\" 20x 5\"      Hip Abduction   2x10 2x10      Hip Extension   2x10 2x10      TA Ball Press Downs          Paloff Press   15x ea BTB       TA Activation   20x 5\" 20x 5\"      TA Marches   20x ea 20x ea 20x ea      LAQ   20x 5#                 THERAPEUTIC ACTIVITY          Reassessment of Function  10'                                      GAIT TRAINING                                                  MODALITIES                                      "

## 2025-04-18 ENCOUNTER — OFFICE VISIT (OUTPATIENT)
Dept: PHYSICAL THERAPY | Facility: MEDICAL CENTER | Age: 55
End: 2025-04-18
Payer: COMMERCIAL

## 2025-04-18 DIAGNOSIS — M54.41 ACUTE BILATERAL LOW BACK PAIN WITH RIGHT-SIDED SCIATICA: Primary | ICD-10-CM

## 2025-04-18 DIAGNOSIS — M54.16 LUMBAR RADICULOPATHY: ICD-10-CM

## 2025-04-18 PROCEDURE — 97110 THERAPEUTIC EXERCISES: CPT

## 2025-04-18 PROCEDURE — 97112 NEUROMUSCULAR REEDUCATION: CPT

## 2025-04-18 NOTE — PROGRESS NOTES
"Daily Note     Today's date: 2025  Patient name: Ora Veliz  : 1970  MRN: [id number]  Referring provider: Sherwin Redman DO  Dx:   Encounter Diagnosis     ICD-10-CM    1. Acute bilateral low back pain with right-sided sciatica  M54.41       2. Lumbar radiculopathy  M54.16           Start Time: 09  Stop Time: 1053  Total time in clinic (min): 56 minutes    Subjective: Patient reports that she did do some back extensions at the gym yesterday and had some decent amount of back pain after.       Objective: See treatment diary below      Assessment: Patient tolerated treatment well. Patient demonstrated fatigue post treatment, exhibited good technique with therapeutic exercises, and would benefit from continued PT. Progressed with core strengthening as tolerated.       Plan: Continue per plan of care.  Progress treatment as tolerated.       Daily Treatment Diary     Precautions: universal.   HEP ACCESS CODE:   FOTO Completed On: 24    POC Expires Unit Limit Auth Expiration Date PT/OT/STVisit Limit   3/12/25 bomn N/a 90 PCY                   PT 1:1 entire time  Auth Status DATE 3/24 4/4 4/11 4/18     90v PCY Visit # 13 14 15 18      Remaining         MANUAL THERAPY         SIJ assess/treat                                                      THERAPEUTIC EXERCISE HEP         Sustained prone trial 10' w/ ppu 10\" w/ ppu 10' w/ ppu 10' w/ ppu     L hip into wall standing          Standing ext (hips into wall) Added 12/30         R hip into wall standing          Seated Hamstring Stretch  30\" 3x ea 30\" 3x ea 30\" 3x ea 30\" 3x ea     Quadriceps Stretch  30\" 3x ea 30 3x ea 30\" 3x ea 30\" 3x ea     Ball Rollouts          Back Extensions  10x with ball at wall 10x with ball at wall       Heel/Toe Raises    20x ea                NEUROMUSCULAR REEDUCATION           Seated sciatic nerve sliders Added 12/30         HL PB           Standing PPT c glute iso          Adduction  20x 5\" 20x 5\" 20x 5\" 20x 5\"   " "  Hip Abduction   2x10 2x10 2x10 2#     Hip Extension   2x10 2x10 2x10 2#     TA Ball Press Downs     20x 3\" standing     Paloff Press   15x ea BTB  15x ea GTB     TA Activation   20x 5\" 20x 5\"      TA Marches   20x ea 20x ea 20x ea 20x ea     LAQ   20x 5#                 THERAPEUTIC ACTIVITY          Reassessment of Function  10'                                      GAIT TRAINING                                                  MODALITIES                                      "

## 2025-04-25 ENCOUNTER — OFFICE VISIT (OUTPATIENT)
Dept: PHYSICAL THERAPY | Facility: MEDICAL CENTER | Age: 55
End: 2025-04-25
Payer: COMMERCIAL

## 2025-04-25 DIAGNOSIS — M54.41 ACUTE BILATERAL LOW BACK PAIN WITH RIGHT-SIDED SCIATICA: Primary | ICD-10-CM

## 2025-04-25 DIAGNOSIS — M54.16 LUMBAR RADICULOPATHY: ICD-10-CM

## 2025-04-25 PROCEDURE — 97112 NEUROMUSCULAR REEDUCATION: CPT

## 2025-04-25 PROCEDURE — 97530 THERAPEUTIC ACTIVITIES: CPT

## 2025-04-25 PROCEDURE — 97140 MANUAL THERAPY 1/> REGIONS: CPT

## 2025-04-25 PROCEDURE — 97110 THERAPEUTIC EXERCISES: CPT

## 2025-04-25 NOTE — PROGRESS NOTES
"Daily Note     Today's date: 2025  Patient name: Ora Veliz  : 1970  MRN: [id number]  Referring provider: Sherwin Remdan DO  Dx:   Encounter Diagnosis     ICD-10-CM    1. Acute bilateral low back pain with right-sided sciatica  M54.41       2. Lumbar radiculopathy  M54.16           Start Time: 0950  Stop Time: 1045  Total time in clinic (min): 55 minutes    Subjective: Patient reports that her one leg is hurting her today. She stated that its a little tinge of pain in her R leg yesterday when doing dishes. She stated that she does not feel the pain when she is sitting, only when she is walking or getting out of her car.       Objective: See treatment diary below      Assessment: Patient tolerated treatment well. Patient demonstrated fatigue post treatment, exhibited good technique with therapeutic exercises, and would benefit from continued PT. Patient with some pain in her R hamstring today, given STM to address, noted improvements in pain following. Nothing new reported. Patient educated on keeping resistance and stretch light and cold therapy to improve hamstring pain, verbalized understanding.      Plan: Continue per plan of care.  Progress treatment as tolerated.       Daily Treatment Diary     Precautions: universal.   HEP ACCESS CODE:   FOTO Completed On: 24    POC Expires Unit Limit Auth Expiration Date PT/OT/STVisit Limit   3/12/25 bomn N/a 90 PCY                   PT 1:1 [id number]  Auth Status DATE 3/24 4/4 4/11 4/18 4/25    90v PCY Visit # 13 14 15 18 19     Remaining         MANUAL THERAPY         SIJ assess/treat         STM     IB R Hamstring                                        THERAPEUTIC EXERCISE HEP         Sustained prone trial 10' w/ ppu 10\" w/ ppu 10' w/ ppu 10' w/ ppu 5' w/ ppu    L hip into wall standing          Standing ext (hips into wall) Added          R hip into wall standing          Seated Hamstring Stretch  30\" 3x ea 30\" 3x ea 30\" 3x ea 30\" 3x ea 30\" 3x ea  " "  Quadriceps Stretch  30\" 3x ea 30 3x ea 30\" 3x ea 30\" 3x ea 30\" 3x ea    Ball Rollouts          Back Extensions  10x with ball at wall 10x with ball at wall       Heel/Toe Raises    20x ea                NEUROMUSCULAR REEDUCATION           Seated sciatic nerve sliders Added 12/30         HL PB           Standing PPT c glute iso          Adduction  20x 5\" 20x 5\" 20x 5\" 20x 5\" 20x 5\"    Bridges      20x    Hip Abduction   2x10 2x10 2x10 2# 2x10 ea    Hip Extension   2x10 2x10 2x10 2# 2x10 ea    TA Ball Press Downs     20x 3\" standing 20x 3\" standing    Paloff Press   15x ea BTB  15x ea GTB 15x ea BTB    TA Activation   20x 5\" 20x 5\"      TA Marches   20x ea 20x ea 20x ea 20x ea 20x ea    LAQ   20x 5#                 THERAPEUTIC ACTIVITY          Reassessment of Function  10'        Patient Education      10'                        GAIT TRAINING                                                  MODALITIES                                      "

## 2025-05-02 ENCOUNTER — OFFICE VISIT (OUTPATIENT)
Dept: PHYSICAL THERAPY | Facility: MEDICAL CENTER | Age: 55
End: 2025-05-02
Payer: COMMERCIAL

## 2025-05-02 DIAGNOSIS — M54.16 LUMBAR RADICULOPATHY: ICD-10-CM

## 2025-05-02 DIAGNOSIS — M54.41 ACUTE BILATERAL LOW BACK PAIN WITH RIGHT-SIDED SCIATICA: Primary | ICD-10-CM

## 2025-05-02 PROCEDURE — 97110 THERAPEUTIC EXERCISES: CPT

## 2025-05-02 PROCEDURE — 97112 NEUROMUSCULAR REEDUCATION: CPT

## 2025-05-02 NOTE — PROGRESS NOTES
"Daily Note     Today's date: 2025  Patient name: Ora Veliz  : 1970  MRN: [id number]  Referring provider: Sherwin Redman DO  Dx:   Encounter Diagnosis     ICD-10-CM    1. Acute bilateral low back pain with right-sided sciatica  M54.41       2. Lumbar radiculopathy  M54.16           Start Time: 0959  Stop Time: 1045  Total time in clinic (min): 46 minutes    Subjective: Patient reports that her leg felt better after last session. She stated that she has been continuing to go to the gym.      Objective: See treatment diary below      Assessment: Patient tolerated treatment well. Patient demonstrated fatigue post treatment, exhibited good technique with therapeutic exercises, and would benefit from continued PT. Progressed with increased sets today, tolerated well.      Plan: Continue per plan of care.  Progress treatment as tolerated.       Daily Treatment Diary     Precautions: universal.   HEP ACCESS CODE:   FOTO Completed On: 24    POC Expires Unit Limit Auth Expiration Date PT/OT/STVisit Limit   3/12/25 bomn N/a 90 PCY                   PT 1:1 entire time  Auth Status DATE 3/24 4/4 4/11 4/18 4/25 5/2   90v PCY Visit # 13 14 15 18 19 20    Remaining         MANUAL THERAPY         SIJ assess/treat         STM     IB R Hamstring                                        THERAPEUTIC EXERCISE HEP         Sustained prone trial 10' w/ ppu 10\" w/ ppu 10' w/ ppu 10' w/ ppu 5' w/ ppu 5' w/ ppu   L hip into wall standing          Standing ext (hips into wall) Added 12/30         R hip into wall standing          Seated Hamstring Stretch  30\" 3x ea 30\" 3x ea 30\" 3x ea 30\" 3x ea 30\" 3x ea 30\" 3x ea   Quadriceps Stretch  30\" 3x ea 30 3x ea 30\" 3x ea 30\" 3x ea 30\" 3x ea 30\" 3x ea   Ball Rollouts          Back Extensions  10x with ball at wall 10x with ball at wall       Heel/Toe Raises    20x ea                NEUROMUSCULAR REEDUCATION           Seated sciatic nerve sliders Added 12/30         HL PB       " "    Standing PPT c glute iso          Adduction  20x 5\" 20x 5\" 20x 5\" 20x 5\" 20x 5\" 20x 5\"   Bridges      20x 20x   Hip Abduction   2x10 2x10 2x10 2# 2x10 ea 2x10 ea   Hip Extension   2x10 2x10 2x10 2# 2x10 ea 2x10 ea   TA Ball Press Downs     20x 3\" standing 20x 3\" standing 20x 3\" standing   Paloff Press   15x ea BTB  15x ea GTB 15x ea BTB 2x15 ea BTB   Lower Trunk Rotation       20x ea   TA Activation   20x 5\" 20x 5\"      TA Marches   20x ea 20x ea 20x ea 20x ea 20x ea 20x ea   LAQ   20x 5#                 THERAPEUTIC ACTIVITY          Reassessment of Function  10'        Patient Education      10'                        GAIT TRAINING                                                  MODALITIES                                      "

## 2025-05-09 ENCOUNTER — OFFICE VISIT (OUTPATIENT)
Dept: PHYSICAL THERAPY | Facility: MEDICAL CENTER | Age: 55
End: 2025-05-09
Payer: COMMERCIAL

## 2025-05-09 DIAGNOSIS — M54.41 ACUTE BILATERAL LOW BACK PAIN WITH RIGHT-SIDED SCIATICA: Primary | ICD-10-CM

## 2025-05-09 DIAGNOSIS — M54.16 LUMBAR RADICULOPATHY: ICD-10-CM

## 2025-05-09 PROCEDURE — 97530 THERAPEUTIC ACTIVITIES: CPT

## 2025-05-09 PROCEDURE — 97110 THERAPEUTIC EXERCISES: CPT

## 2025-05-09 PROCEDURE — 97112 NEUROMUSCULAR REEDUCATION: CPT

## 2025-05-09 NOTE — PROGRESS NOTES
PT Re-Evaluation     Today's date: 2025  Patient name: Ora Veliz  : 1970  MRN: [id number]  Referring provider: Sherwin Redman DO  Dx:   Encounter Diagnosis     ICD-10-CM    1. Acute bilateral low back pain with right-sided sciatica  M54.41       2. Lumbar radiculopathy  M54.16           Start Time: 1003  Stop Time: 1103  Total time in clinic (min): 60 minutes    Assessment/Plan  Ora Veliz is a 54 y.o. female who was referred to physical therapy for management of acute R sided low back pain with medical dx of lumbar radiculopathy.  Primary impairments include report of severe pain that significantly limited ability to capture objective measurements.  Consequently, patient has difficulty completing ADLs including prolonged standing/ambulation, sitting, and completing all ADLs.  Ora would benefit from skilled intervention to address all deficits and improve functional capability.  Patient is a good candidate for therapy, pending compliance with HEP and consistent participation in physical therapy.  Thank you for the referral and please do not hesitate to contact me with any questions or concerns regarding Ora’s care!      2025  Patient has completed 21 PT sessions to this date and has been progressing well.  Upon Re-Evaluation, the patient demonstrates improvements in hip flexion strength. Patient continues to demonstrate deficits in activity tolerance  therefore affecting the patient's quality of life and level of function, limiting the patient from standing for extended periods of time, walking, lifting, cleaning, ADLs .  Patient was educated about hip progress thus far and how continued ROM and strengthening will further improve remaining limitations. HEP progressed and updated to reflect current patient function. Patient was educated about safety with exercises and educated on performing correctly and safely.  All other questions and concerns were addressed.  I believe this patient will  continue to benefit from skilled PT to address remaining deficits in thoracic/lumbar spine and hip strength, neuromuscular control, pain, and activity tolerance, improve activity tolerance, and quality of life.          Plan  Frequency: 2x per week   Duration in weeks: 12   POC start date: 5/9/2025  POC end date: 8/9//2025  Therapeutic exercise/activity, neuromuscular reeducation, manual therapy, and modalities.   Patient understands and agrees to plan of care.    Goals  Short Term--4 weeks  1. Patient will report that pain is intermittent with 2 point decrease in pain at its worst. MET  2. Patient will demonstrate 1/2 point increase in all deficient MMT scores. MET  3. Patient will demonstrate ability to self-manage sx with HEP. MET    Long Term--By Discharge  1. Patient will achieve expected FOTO score.  2. Patient will report no limitation with ambulation secondary to lower back. PROGRESSING  3. Patient will report no sleep disturbance secondary to lower back. MET  4. Patient will report no difficulty transitioning off of toilet/stepping into tub secondary to lower back. MET  5. Patient will be able to complete a flight of stairs without pain to improve mobility at home and within the community by discharge (new goal)  Patient's Goal: Get rid of the pain. Be able to do daily activities with trouble. MET      Subjective    History   Date of Onset:     12/1/24 Description: Insidious onset of coccyx pain (which she originally thought was an old injury acting up because of the weather) that progressively worsened and radiated superiorly across the lower back. After about a week since the pain first started she began to experience shooting pain down her R leg and her first toe went completely numb. She had her  take her to the [facility] ER on a Saturday afternoon, where CT scan was performed. Because of findings, it was decided that an MRI was needed, so patient had to be transferred to [place] campus.  MRI findings below, and patient was kept for observation until Monday.       5/9/2025  Patient reports that she is not too bad today. She stated that her most pain is when she is standing and cleaning a lot. She stated that she went to the gym 5 times this week. She still has difficulty with getting in the car. She has been working on her strength and exercises at home.      Current Pain: 5/10  Pain at best: 0/10  Pain at worst: 7/10    Imaging-- 12/8/24 MRI Lumbar     IMPRESSION:  Inferiorly extruded disc herniation at L5-S1 on the right compressing and displacing the S1 nerve root posteriorly and laterally. Correlate for right S1 radiculopathy.  There is also mild right foraminal narrowing with disc material abutting the undersurface of the L5 nerve.  Mild annular bulging and facet arthropathy at the other lower thoracic and lumbar levels with mild canal stenosis and foraminal narrowing      Social History  Ora lives with her  in a multistory home with bedroom on second floor.        Objective    Lumbar  % of normal   Flex. 100   Extn. 100   SB Left 100   SB Right 100   ROT Left 100   ROT Right 100   Repetitive testing: extension= sustained prone no worse   Flexion= worse        MMT         AROM          PROM    Hip       L       R        L           R      L     R   Flex. 4+/5 4+/5       Add. 5/5 5/5       Abd. 5/5 5/5               SI joint: Standing flex =   POS R SIJ hypomobility               Daily Treatment Diary     Precautions: universal.   HEP ACCESS CODE:   FOTO Completed On: 12/18/24    POC Expires Unit Limit Auth Expiration Date PT/OT/STVisit Limit   3/12/25 bomn N/a 90 PCY                   PT 1:1 8525-7581  Auth Status DATE 5/9 4/25 5/2   90v PCY Visit # 21    19 20    Remaining         MANUAL THERAPY         SIJ assess/treat         STM     IB R Hamstring                                        THERAPEUTIC EXERCISE HEP         Sustained prone trial     5' w/ ppu 5' w/ ppu   L hip into wall  "standing          Standing ext (hips into wall) Added 12/30         R hip into wall standing          Seated Hamstring Stretch  30\" 3x ea    30\" 3x ea 30\" 3x ea   Quadriceps Stretch  30\" 3x ea    30\" 3x ea 30\" 3x ea   Ball Rollouts          Back Extensions          Heel/Toe Raises                    NEUROMUSCULAR REEDUCATION           Seated sciatic nerve sliders Added 12/30         HL PB           Standing PPT c glute iso          Adduction  20x 5\"    20x 5\" 20x 5\"   Bridges  20x    20x 20x   Hip Abduction  2x10 ea 5#    2x10 ea 2x10 ea   Hip Extension  2x10 ea 5#    2x10 ea 2x10 ea   TA Ball Press Downs      20x 3\" standing 20x 3\" standing   Paloff Press      15x ea BTB 2x15 ea BTB   Lower Trunk Rotation  20x ea     20x ea   TA Activation  20x 3\"        TA Marches   20x ea    20x ea 20x ea   LAQ  3x15 5#                  THERAPEUTIC ACTIVITY          Reassessment of Function          Patient Education      10'                        GAIT TRAINING                                                  MODALITIES                                    "

## 2025-05-14 ENCOUNTER — VBI (OUTPATIENT)
Dept: ADMINISTRATIVE | Facility: OTHER | Age: 55
End: 2025-05-14

## 2025-05-14 NOTE — TELEPHONE ENCOUNTER
05/14/25 9:30 AM     Chart reviewed for   Cervical Cancer Screening    ; nothing is submitted to the patient's insurance at this time.     BIRDIE DELVALLE MA   [facility]

## 2025-05-16 ENCOUNTER — OFFICE VISIT (OUTPATIENT)
Dept: PHYSICAL THERAPY | Facility: MEDICAL CENTER | Age: 55
End: 2025-05-16
Payer: COMMERCIAL

## 2025-05-16 DIAGNOSIS — M54.16 LUMBAR RADICULOPATHY: ICD-10-CM

## 2025-05-16 DIAGNOSIS — M54.41 ACUTE BILATERAL LOW BACK PAIN WITH RIGHT-SIDED SCIATICA: Primary | ICD-10-CM

## 2025-05-16 PROCEDURE — 97110 THERAPEUTIC EXERCISES: CPT

## 2025-05-16 PROCEDURE — 97112 NEUROMUSCULAR REEDUCATION: CPT

## 2025-05-16 NOTE — PROGRESS NOTES
"Daily Note     Today's date: 2025  Patient name: Ora Veliz  : 1970  MRN: [id number]  Referring provider: Sherwin Redman DO  Dx:   Encounter Diagnosis     ICD-10-CM    1. Acute bilateral low back pain with right-sided sciatica  M54.41       2. Lumbar radiculopathy  M54.16           Start Time: 0950  Stop Time: 1040  Total time in clinic (min): 50 minutes    Subjective: Patient reports that she felt pretty good after last session. Able to get around a little better. Still some difficulty with stairs.      Objective: See treatment diary below      Assessment: Patient tolerated treatment well. Patient demonstrated fatigue post treatment, exhibited good technique with therapeutic exercises, and would benefit from continued PT. Added rows and extensions today for postural control, tolerated well.      Plan: Continue per plan of care.  Progress treatment as tolerated.       Daily Treatment Diary     Precautions: universal.   HEP ACCESS CODE:   FOTO Completed On: 24    POC Expires Unit Limit Auth Expiration Date PT/OT/STVisit Limit   3/12/25 bomn N/a 90 PCY                   PT 1:1 entire time  Auth Status DATE    90v PCY Visit # 21 22   19 20    Remaining         MANUAL THERAPY         SIJ assess/treat         STM     IB R Hamstring                                        THERAPEUTIC EXERCISE HEP         Sustained prone trial     5' w/ ppu 5' w/ ppu   L hip into wall standing          Standing ext (hips into wall) Added 12/         R hip into wall standing          Seated Hamstring Stretch  30\" 3x ea    30\" 3x ea 30\" 3x ea   Quadriceps Stretch  30\" 3x ea 30\" 3x ea   30\" 3x ea 30\" 3x ea   Ball Rollouts          Back Extensions          Heel/Toe Raises   20x ea                 NEUROMUSCULAR REEDUCATION           Seated sciatic nerve sliders Added 12/30         HL PB           Standing PPT c glute iso          Adduction  20x 5\"    20x 5\" 20x 5\"   Bridges  20x    20x 20x " "  Marching   20x ea 5#       Hip Abduction  2x10 ea 5# 2x10 ea 5#   2x10 ea 2x10 ea   Hip Extension  2x10 ea 5# 2x10 ea 5#   2x10 ea 2x10 ea   TA Ball Press Downs      20x 3\" standing 20x 3\" standing   Paloff Press   15x ea BTB   15x ea BTB 2x15 ea BTB   Lower Trunk Rotation  20x ea     20x ea   TA Activation  20x 3\"        TA Marches   20x ea    20x ea 20x ea   LAQ  3x15 5# 2x15 7#       TB GH Row   20x BTB       TB GH Ext   20x BTB                 THERAPEUTIC ACTIVITY          Reassessment of Function          Patient Education      10'                        GAIT TRAINING                                                  MODALITIES                                        "

## 2025-05-21 ENCOUNTER — OFFICE VISIT (OUTPATIENT)
Dept: FAMILY MEDICINE CLINIC | Facility: CLINIC | Age: 55
End: 2025-05-21
Payer: COMMERCIAL

## 2025-05-21 VITALS
WEIGHT: 293 LBS | BODY MASS INDEX: 44.41 KG/M2 | DIASTOLIC BLOOD PRESSURE: 78 MMHG | SYSTOLIC BLOOD PRESSURE: 126 MMHG | OXYGEN SATURATION: 97 % | HEIGHT: 68 IN | TEMPERATURE: 97.4 F | HEART RATE: 74 BPM

## 2025-05-21 DIAGNOSIS — E66.01 MORBID OBESITY WITH BMI OF 50.0-59.9, ADULT (HCC): ICD-10-CM

## 2025-05-21 DIAGNOSIS — J01.00 ACUTE NON-RECURRENT MAXILLARY SINUSITIS: ICD-10-CM

## 2025-05-21 DIAGNOSIS — I89.0 LYMPHEDEMA: ICD-10-CM

## 2025-05-21 DIAGNOSIS — M54.16 LUMBAR BACK PAIN WITH RADICULOPATHY AFFECTING RIGHT LOWER EXTREMITY: Primary | ICD-10-CM

## 2025-05-21 DIAGNOSIS — Z13.1 DIABETES MELLITUS SCREENING: ICD-10-CM

## 2025-05-21 DIAGNOSIS — Z13.220 SCREENING CHOLESTEROL LEVEL: ICD-10-CM

## 2025-05-21 PROCEDURE — 99214 OFFICE O/P EST MOD 30 MIN: CPT | Performed by: FAMILY MEDICINE

## 2025-05-21 RX ORDER — AZITHROMYCIN 250 MG/1
TABLET, FILM COATED ORAL
Qty: 6 TABLET | Refills: 0 | Status: SHIPPED | OUTPATIENT
Start: 2025-05-21 | End: 2025-05-26

## 2025-05-21 NOTE — ASSESSMENT & PLAN NOTE
Please continue follow-up with your physical therapist, discuss benefit of aqua therapy and chiropractic care  Patient should reach out to insurance company to discuss coverage for these  Recommend follow-up with compressive spine pain if there is worsening symptoms

## 2025-05-21 NOTE — PROGRESS NOTES
"Name: Ora Veliz      : 1970      MRN: [id number]  Encounter Provider: Keo Saenz MD  Encounter Date: 2025   Encounter department: [facility]    Assessment & Plan  Lumbar back pain with radiculopathy affecting right lower extremity  Please continue follow-up with your physical therapist, discuss benefit of aqua therapy and chiropractic care  Patient should reach out to insurance company to discuss coverage for these  Recommend follow-up with compressive spine pain if there is worsening symptoms    Morbid obesity with BMI of 50.0-59.9, adult (HCC)  Discussed benefit of weight loss for patient's chronic pain, encourage patient to look into weight loss medication such as GLP-1 agonist such as Ozempic, Wegovy    Diabetes mellitus screening    Orders:    Hemoglobin A1C; Future    Screening cholesterol level    Orders:    Lipid panel; Future    Acute non-recurrent maxillary sinusitis  2-week history of URI symptoms, significant tenderness over the maxillary sinus and frontal sinus, will start patient on azithromycin for treatment  Orders:    azithromycin (Zithromax) 250 mg tablet; Take 2 tablets (500 mg total) by mouth daily for 1 day, THEN 1 tablet (250 mg total) daily for 4 days.    Lymphedema  Continue compression stocking            History of Present Illness       Back Pain  Associated symptoms include headaches. Pertinent negatives include no abdominal pain, chest pain or fever.     Ora is a 54-year-old female patient here to establish care.  Patient does have underlying chronic lumbar back pain to get the rest of fibromyalgia.  Patient has been seen by spine pain and is currently seeing physical therapy for management of her lumbar back pain.    MRI of lumbar spine wo contract from 24 shows:   \"Inferiorly extruded disc herniation at L5-S1 on the right compressing and displacing the S1 nerve root posteriorly and laterally. Correlate for right S1 radiculopathy.     There is " "also mild right foraminal narrowing with disc material abutting the undersurface of the L5 nerve.     Mild annular bulging and facet arthropathy at the other lower thoracic and lumbar levels with mild canal stenosis and foraminal narrowing\"    Overall patient has noted some improvement in her symptoms, her activity level has significant improved compared to beginning of the year.  She continues to have pain however is currently being titrated down on medication including gabapentin 400 mg 3 times a day.  Currently still taking methocarbamol and Tylenol    Patient is interested in aqua therapy to help with her symptoms.    Her other concern includes recent bout of sinusitis.  Patient has been experiencing nasal congestion, productive cough and pressure in her frontal and maxillary sinuses.  Pain seems to worsen depending on positional changes.        Review of Systems   Constitutional:  Negative for chills and fever.   HENT:  Positive for congestion, postnasal drip, rhinorrhea, sinus pressure and sinus pain. Negative for sore throat.    Eyes:  Positive for discharge.   Respiratory:  Negative for chest tightness and shortness of breath.    Cardiovascular:  Negative for chest pain.   Gastrointestinal:  Negative for abdominal pain, constipation, diarrhea, nausea and vomiting.   Musculoskeletal:  Positive for arthralgias and back pain.   Neurological:  Positive for headaches. Negative for dizziness and light-headedness.       Past Medical History:   Diagnosis Date    Asthma     Fibromyalgia      Past Surgical History:   Procedure Laterality Date    HERNIA REPAIR       Family History   Problem Relation Age of Onset    Depression Mother     Thyroid disease Mother     Diabetes Mother     Hyperlipidemia Father      Social History     Tobacco Use    Smoking status: Former     Average packs/day: 3.0 packs/day for 2.0 years (6.0 ttl pk-yrs)     Types: Cigarettes     Start date: 1989     Quit date: 1991     Years since quitting: " "34.4    Smokeless tobacco: Never   Vaping Use    Vaping status: Never Used   Substance and Sexual Activity    Alcohol use: Not Currently    Drug use: Not Currently    Sexual activity: Not on file     Medications[1]  Allergies   Allergen Reactions    Pollen Extract Cough     Immunization History   Administered Date(s) Administered    COVID-19 MODERNA VACC 0.5 ML IM 09/23/2021, 10/21/2021     Objective   /78 (BP Location: Left arm, Patient Position: Sitting, Cuff Size: Large)   Pulse 74   Temp (!) 97.4 °F (36.3 °C)   Ht 5' 8\" (1.727 m)   Wt (!) 180 kg (397 lb 6.4 oz)   SpO2 97%   Breastfeeding No   BMI 60.42 kg/m²     Physical Exam  Vitals reviewed.   Constitutional:       General: She is not in acute distress.     Appearance: Normal appearance. She is obese. She is not ill-appearing, toxic-appearing or diaphoretic.   HENT:      Head:      Comments: Tenderness over the frontal and maxillary sinus    Cardiovascular:      Rate and Rhythm: Normal rate.      Pulses: Normal pulses.   Pulmonary:      Effort: Pulmonary effort is normal.   Abdominal:      General: Abdomen is flat.     Musculoskeletal:      Comments: Lymphedema bilateral LE     Skin:     General: Skin is warm.      Capillary Refill: Capillary refill takes less than 2 seconds.      Comments: Red cheeks     Neurological:      General: No focal deficit present.      Mental Status: She is alert.     Psychiatric:         Mood and Affect: Mood normal.         Keo Saenz M.D.  Family Medicine    Please excuse any \"sound-alike\" errors that may have ocurred during the process of dictation. Parts of this note have been dictated and there may be errors present in the transcription process. Thank you.         [1]   Current Outpatient Medications on File Prior to Visit   Medication Sig    acetaminophen (TYLENOL) 325 mg tablet Take 3 tablets (975 mg total) by mouth every 8 (eight) hours    gabapentin (NEURONTIN) 400 mg capsule Take 1 capsule (400 mg total) by " mouth 3 (three) times a day    methocarbamol (ROBAXIN) 750 mg tablet Take 1 tablet (750 mg total) by mouth every 6 (six) hours    [DISCONTINUED] oxyCODONE-acetaminophen (Percocet) 5-325 mg per tablet Take 1 tablet by mouth every 8 (eight) hours as needed for moderate pain Max Daily Amount: 3 tablets (Patient not taking: Reported on 2/3/2025)    [DISCONTINUED] pantoprazole (PROTONIX) 40 mg tablet TAKE 1 TABLET (40 MG TOTAL) BY MOUTH DAILY IN THE EARLY MORNING

## 2025-05-21 NOTE — ASSESSMENT & PLAN NOTE
Discussed benefit of weight loss for patient's chronic pain, encourage patient to look into weight loss medication such as GLP-1 agonist such as Ozempic, Wegovy

## 2025-05-23 ENCOUNTER — OFFICE VISIT (OUTPATIENT)
Dept: PHYSICAL THERAPY | Facility: MEDICAL CENTER | Age: 55
End: 2025-05-23
Payer: COMMERCIAL

## 2025-05-23 DIAGNOSIS — M54.16 LUMBAR RADICULOPATHY: ICD-10-CM

## 2025-05-23 DIAGNOSIS — M54.41 ACUTE BILATERAL LOW BACK PAIN WITH RIGHT-SIDED SCIATICA: Primary | ICD-10-CM

## 2025-05-23 PROCEDURE — 97112 NEUROMUSCULAR REEDUCATION: CPT

## 2025-05-23 PROCEDURE — 97110 THERAPEUTIC EXERCISES: CPT

## 2025-05-23 NOTE — PROGRESS NOTES
"Daily Note     Today's date: 2025  Patient name: Ora Veliz  : 1970  MRN: [id number]  Referring provider: Sherwin Redman DO  Dx:   Encounter Diagnosis     ICD-10-CM    1. Acute bilateral low back pain with right-sided sciatica  M54.41       2. Lumbar radiculopathy  M54.16           Start Time: 1000  Stop Time: 1058  Total time in clinic (min): 58 minutes    Subjective: Patient reports that she is not too bad today      Objective: See treatment diary below      Assessment: Patient tolerated treatment well. Patient demonstrated fatigue post treatment, exhibited good technique with therapeutic exercises, and would benefit from continued PT. Continues to progress as tolerated. Nothing new noted today. Progressed with marching today with TA press downs.      Plan: Continue per plan of care.  Progress treatment as tolerated.       Daily Treatment Diary     Precautions: universal.   HEP ACCESS CODE:   FOTO Completed On: 24    POC Expires Unit Limit Auth Expiration Date PT/OT/STVisit Limit   3/12/25 bomn N/a 90 PCY                   PT 1:1 [id number]  Auth Status DATE       90v PCY Visit # 21 22 23       Remaining         MANUAL THERAPY         SIJ assess/treat         STM                                             THERAPEUTIC EXERCISE HEP         Sustained prone trial         L hip into wall standing          Standing ext (hips into wall) Added 12         R hip into wall standing          Seated Hamstring Stretch  30\" 3x ea  30\" 3x ea      Quadriceps Stretch  30\" 3x ea 30\" 3x ea 30\" 3x ea      Ball Rollouts          Back Extensions          Heel/Toe Raises   20x ea 20x ea                NEUROMUSCULAR REEDUCATION           Seated sciatic nerve sliders Added 12/30         HL PB           Standing PPT c glute iso          Adduction  20x 5\"  20x 5\"      Bridges  20x  20x      Marching   20x ea 5# 20x ea 5#      Hip Abduction  2x10 ea 5# 2x10 ea 5# 2x10 ea 5#      Hip Extension  2x10 " "ea 5# 2x10 ea 5# 2x10 ea 5#      TA Ball Press Downs    20x w/ march      Paloff Press   15x ea BTB 2x15 ea BTB      Lower Trunk Rotation  20x ea        TA Activation  20x 3\"  20x 3\"      TA Marches   20x ea  20x ea      LAQ  3x15 5# 2x15 7# 2x15 10#      TB GH Row   20x BTB       TB GH Ext   20x BTB                 THERAPEUTIC ACTIVITY          Reassessment of Function          Patient Education                              GAIT TRAINING                                                  MODALITIES                                        "

## 2025-05-30 ENCOUNTER — OFFICE VISIT (OUTPATIENT)
Dept: PHYSICAL THERAPY | Facility: MEDICAL CENTER | Age: 55
End: 2025-05-30
Payer: COMMERCIAL

## 2025-05-30 ENCOUNTER — APPOINTMENT (OUTPATIENT)
Dept: LAB | Facility: MEDICAL CENTER | Age: 55
End: 2025-05-30
Attending: FAMILY MEDICINE
Payer: COMMERCIAL

## 2025-05-30 DIAGNOSIS — M54.41 ACUTE BILATERAL LOW BACK PAIN WITH RIGHT-SIDED SCIATICA: ICD-10-CM

## 2025-05-30 DIAGNOSIS — Z13.1 DIABETES MELLITUS SCREENING: ICD-10-CM

## 2025-05-30 DIAGNOSIS — M54.16 LUMBAR RADICULOPATHY: Primary | ICD-10-CM

## 2025-05-30 LAB
EST. AVERAGE GLUCOSE BLD GHB EST-MCNC: 126 MG/DL
HBA1C MFR BLD: 6 %

## 2025-05-30 PROCEDURE — 97110 THERAPEUTIC EXERCISES: CPT

## 2025-05-30 PROCEDURE — 83036 HEMOGLOBIN GLYCOSYLATED A1C: CPT

## 2025-05-30 PROCEDURE — 97112 NEUROMUSCULAR REEDUCATION: CPT

## 2025-05-30 PROCEDURE — 36415 COLL VENOUS BLD VENIPUNCTURE: CPT

## 2025-05-30 NOTE — PROGRESS NOTES
"Daily Note     Today's date: 2025  Patient name: Ora Veliz  : 1970  MRN: [id number]  Referring provider: Sherwin Redman DO  Dx:   Encounter Diagnosis     ICD-10-CM    1. Lumbar radiculopathy  M54.16       2. Acute bilateral low back pain with right-sided sciatica  M54.41             Start Time: 0950  Stop Time: 1050  Total time in clinic (min): 60 minutes    Subjective: Patient reports that she is feeling good today. Pt stated she was able to do 11 minutes on the treadmill.       Objective: See treatment diary below      Assessment: Patient tolerated treatment well. Patient demonstrated fatigue post treatment, exhibited good technique with therapeutic exercises, and would benefit from continued PT. Continues to progress as tolerated. Progressed to 7# with hip extensions and abductions. Also progressed to 15# with LAQs. Tolerated well with no pain.       Plan: Continue per plan of care.  Progress treatment as tolerated.       Daily Treatment Diary     Precautions: universal.   HEP ACCESS CODE:   FOTO Completed On: 24    POC Expires Unit Limit Auth Expiration Date PT/OT/STVisit Limit   3/12/25 bomn N/a 90 PCY                   PT 1:1 [id number]  Auth Status DATE      90v PCY Visit # 21 22 23       Remaining         MANUAL THERAPY         SIJ assess/treat         STM                                             THERAPEUTIC EXERCISE HEP         Sustained prone trial         L hip into wall standing          Standing ext (hips into wall) Added 12         R hip into wall standing          Seated Hamstring Stretch  30\" 3x ea  30\" 3x ea 30\" 3x ea     Quadriceps Stretch  30\" 3x ea 30\" 3x ea 30\" 3x ea 30\" 3x ea      Ball Rollouts          Back Extensions          Heel/Toe Raises   20x ea 20x ea 20x ea     Press ups      30x     NEUROMUSCULAR REEDUCATION           Seated sciatic nerve sliders Added 12/30         HL PB           Standing PPT c glute iso          Adduction  20x 5\"  " "20x 5\"      Bridges  20x  20x 20x     Marching   20x ea 5# 20x ea 5# 20x ea 5#     Hip Abduction  2x10 ea 5# 2x10 ea 5# 2x10 ea 5# 20x 7# ea     Hip Extension  2x10 ea 5# 2x10 ea 5# 2x10 ea 5# 2x10 ea  7#     TA Ball Press Downs    20x w/ march 2x 15 w/  March      Paloff Press   15x ea BTB 2x15 ea BTB      Lower Trunk Rotation  20x ea   20x ea      TA Activation  20x 3\"  20x 3\" 20x 3\"     TA Marches   20x ea  20x ea 20x ea      LAQ  3x15 5# 2x15 7# 2x15 10# 2x15 15#     TB GH Row   20x BTB       TB GH Ext   20x BTB                 THERAPEUTIC ACTIVITY          Reassessment of Function          Patient Education                              GAIT TRAINING                                                  MODALITIES                                        "

## 2025-05-30 NOTE — PROGRESS NOTES
"Daily Note     Today's date: 2025  Patient name: Ora Veliz  : 1970  MRN: [id number]  Referring provider: Sherwin Redman DO  Dx:   No diagnosis found.                 Subjective: Patient reports that she is not too bad today      Objective: See treatment diary below      Assessment: Patient tolerated treatment well. Patient demonstrated fatigue post treatment, exhibited good technique with therapeutic exercises, and would benefit from continued PT. Continues to progress as tolerated. Nothing new noted today. Progressed with marching today with TA press downs.      Plan: Continue per plan of care.  Progress treatment as tolerated.       Daily Treatment Diary     Precautions: universal.   HEP ACCESS CODE:   FOTO Completed On: 24    POC Expires Unit Limit Auth Expiration Date PT/OT/STVisit Limit   3/12/25 bomn N/a 90 PCY                   PT 1:1 [id number]  Auth Status DATE      90v PCY Visit # 21 22 23       Remaining         MANUAL THERAPY         SIJ assess/treat         STM                                             THERAPEUTIC EXERCISE HEP         Sustained prone trial         L hip into wall standing          Standing ext (hips into wall) Added          R hip into wall standing          Seated Hamstring Stretch  30\" 3x ea  30\" 3x ea      Quadriceps Stretch  30\" 3x ea 30\" 3x ea 30\" 3x ea      Ball Rollouts          Back Extensions          Heel/Toe Raises   20x ea 20x ea                NEUROMUSCULAR REEDUCATION           Seated sciatic nerve sliders Added 30         HL PB           Standing PPT c glute iso          Adduction  20x 5\"  20x 5\"      Bridges  20x  20x      Marching   20x ea 5# 20x ea 5#      Hip Abduction  2x10 ea 5# 2x10 ea 5# 2x10 ea 5#      Hip Extension  2x10 ea 5# 2x10 ea 5# 2x10 ea 5#      TA Ball Press Downs    20x w/ march      Paloff Press   15x ea BTB 2x15 ea BTB      Lower Trunk Rotation  20x ea        TA Activation  20x 3\"  20x 3\"    "   TA Marches   20x ea  20x ea      LAQ  3x15 5# 2x15 7# 2x15 10#      TB GH Row   20x BTB       TB GH Ext   20x BTB                 THERAPEUTIC ACTIVITY          Reassessment of Function          Patient Education                              GAIT TRAINING                                                  MODALITIES

## 2025-06-01 ENCOUNTER — RESULTS FOLLOW-UP (OUTPATIENT)
Dept: FAMILY MEDICINE CLINIC | Facility: CLINIC | Age: 55
End: 2025-06-01

## 2025-06-18 ENCOUNTER — VBI (OUTPATIENT)
Dept: ADMINISTRATIVE | Facility: OTHER | Age: 55
End: 2025-06-18

## 2025-06-18 NOTE — TELEPHONE ENCOUNTER
06/18/25 3:41 PM     Chart reviewed for Mammogram ; nothing is submitted to the patient's insurance at this time.     Tila Johnston MA   [facility]

## 2025-07-09 ENCOUNTER — OFFICE VISIT (OUTPATIENT)
Dept: FAMILY MEDICINE CLINIC | Facility: CLINIC | Age: 55
End: 2025-07-09
Payer: COMMERCIAL

## 2025-07-09 VITALS
OXYGEN SATURATION: 96 % | HEIGHT: 68 IN | HEART RATE: 74 BPM | TEMPERATURE: 97.4 F | SYSTOLIC BLOOD PRESSURE: 130 MMHG | DIASTOLIC BLOOD PRESSURE: 82 MMHG | BODY MASS INDEX: 44.41 KG/M2 | WEIGHT: 293 LBS

## 2025-07-09 DIAGNOSIS — Z13.0 SCREENING FOR DEFICIENCY ANEMIA: ICD-10-CM

## 2025-07-09 DIAGNOSIS — Z00.00 ANNUAL PHYSICAL EXAM: Primary | ICD-10-CM

## 2025-07-09 DIAGNOSIS — Z12.11 SCREENING FOR COLORECTAL CANCER: ICD-10-CM

## 2025-07-09 DIAGNOSIS — Z13.1 DIABETES MELLITUS SCREENING: ICD-10-CM

## 2025-07-09 DIAGNOSIS — Z12.12 SCREENING FOR COLORECTAL CANCER: ICD-10-CM

## 2025-07-09 DIAGNOSIS — Z13.220 SCREENING CHOLESTEROL LEVEL: ICD-10-CM

## 2025-07-09 PROCEDURE — 99396 PREV VISIT EST AGE 40-64: CPT | Performed by: FAMILY MEDICINE

## 2025-07-09 NOTE — PROGRESS NOTES
Adult Annual Physical  Name: Ora Veliz      : 1970      MRN: [id number]  Encounter Provider: Keo Saenz MD  Encounter Date: 2025   Encounter department: [facility]    :  Assessment & Plan  Annual physical exam  Complete blood work in December  Follow up 6 month   Orders:    Comprehensive metabolic panel; Future    CBC and differential; Future    Lipid panel; Future    Hemoglobin A1C; Future    Screening for colorectal cancer      Diabetes mellitus screening    Orders:    Comprehensive metabolic panel; Future    Hemoglobin A1C; Future    Screening cholesterol level    Orders:    Lipid panel; Future    Screening for deficiency anemia    Orders:    CBC and differential; Future        Preventive Screenings:  - Diabetes Screening: screening up-to-date  - Lung cancer screening: screening not indicated     Immunizations:  - Immunizations due: Prevnar 20, Tdap and Zoster (Shingrix)      Depression Screening and Follow-up Plan: Patient was screened for depression during today's encounter. They screened negative with a PHQ-2 score of 0.          History of Present Illness     Adult Annual Physical:  Patient presents for annual physical. Pt here for annual physical  As well as evaluation May patient's weight decreased from 397 pounds to 371 pounds  Patient continues to have lumbar back pain, not debilitating, but prolonged standing causes pain  Didn't go to PT due to concern about PT coverage.     Diet and Physical Activity:  - Diet/Nutrition: limited junk food and well balanced diet.  - Exercise: moderate cardiovascular exercise, strength training exercises, 30-60 minutes on average and 5-7 times a week on average.    Depression Screening:  - PHQ-2 Score: 0    General Health:  - Sleep: sleeps well and 4-6 hours of sleep on average.  - Hearing: normal hearing bilateral ears.  - Vision: no vision problems.  - Dental: no dental visits for > 1 year, brushes teeth twice daily and does not  "floss.    /GYN Health:  - Follows with GYN: no.   - Menopause: postmenopausal.   - History of STDs: no  - Contraception: menopause.      Review of Systems   Constitutional:  Negative for chills and fever.   HENT:  Negative for congestion, rhinorrhea and sore throat.         URI a few weeks back   Respiratory:  Negative for chest tightness and shortness of breath.    Cardiovascular:  Negative for chest pain.   Gastrointestinal:  Negative for abdominal pain, constipation, diarrhea, nausea and vomiting.   Musculoskeletal:  Positive for back pain.   Neurological:  Negative for dizziness, light-headedness and headaches.       Objective   /82 (BP Location: Right arm, Patient Position: Sitting, Cuff Size: Large)   Pulse 74   Temp (!) 97.4 °F (36.3 °C)   Ht 5' 8\" (1.727 m)   Wt (!) 169 kg (371 lb 9.6 oz)   SpO2 96%   Breastfeeding No   BMI 56.50 kg/m²     Physical Exam  Vitals reviewed.   Constitutional:       General: She is not in acute distress.     Appearance: Normal appearance. She is not ill-appearing, toxic-appearing or diaphoretic.     Cardiovascular:      Rate and Rhythm: Normal rate and regular rhythm.      Pulses: Normal pulses.      Heart sounds: Normal heart sounds. No murmur heard.  Pulmonary:      Effort: Pulmonary effort is normal. No respiratory distress.      Breath sounds: Normal breath sounds. No stridor. No wheezing or rhonchi.   Abdominal:      General: Abdomen is flat. There is no distension.      Palpations: Abdomen is soft. There is no mass.      Tenderness: There is no abdominal tenderness.      Hernia: No hernia is present.     Musculoskeletal:         General: No swelling or deformity.      Right lower leg: Edema (mild) present.      Left lower leg: Edema (mild) present.     Skin:     General: Skin is warm and dry.      Capillary Refill: Capillary refill takes less than 2 seconds.      Coloration: Skin is not jaundiced.     Neurological:      General: No focal deficit present.      " "Mental Status: She is alert and oriented to person, place, and time.     Psychiatric:         Mood and Affect: Mood normal.           Koe Saenz M.D.  Family Medicine    Please excuse any \"sound-alike\" errors that may have ocurred during the process of dictation. Parts of this note have been dictated and there may be errors present in the transcription process. Thank you.    "

## 2025-07-09 NOTE — ASSESSMENT & PLAN NOTE
Complete blood work in December  Follow up 6 month   Orders:    Comprehensive metabolic panel; Future    CBC and differential; Future    Lipid panel; Future    Hemoglobin A1C; Future

## 2025-07-20 DIAGNOSIS — M54.50 LOW BACK PAIN: ICD-10-CM

## 2025-07-21 RX ORDER — METHOCARBAMOL 750 MG/1
750 TABLET, FILM COATED ORAL 4 TIMES DAILY
Qty: 120 TABLET | Refills: 1 | Status: SHIPPED | OUTPATIENT
Start: 2025-07-21

## 2025-07-28 ENCOUNTER — EVALUATION (OUTPATIENT)
Dept: PHYSICAL THERAPY | Facility: MEDICAL CENTER | Age: 55
End: 2025-07-28
Payer: COMMERCIAL

## 2025-07-28 DIAGNOSIS — M54.41 ACUTE BILATERAL LOW BACK PAIN WITH RIGHT-SIDED SCIATICA: Primary | ICD-10-CM

## 2025-07-28 DIAGNOSIS — M54.16 LUMBAR RADICULOPATHY: ICD-10-CM

## 2025-07-28 PROCEDURE — 97161 PT EVAL LOW COMPLEX 20 MIN: CPT

## 2025-07-28 PROCEDURE — 97110 THERAPEUTIC EXERCISES: CPT

## 2025-08-06 ENCOUNTER — OFFICE VISIT (OUTPATIENT)
Dept: PHYSICAL THERAPY | Facility: MEDICAL CENTER | Age: 55
End: 2025-08-06
Payer: COMMERCIAL

## 2025-08-06 DIAGNOSIS — M54.16 LUMBAR RADICULOPATHY: ICD-10-CM

## 2025-08-06 DIAGNOSIS — M54.41 ACUTE BILATERAL LOW BACK PAIN WITH RIGHT-SIDED SCIATICA: Primary | ICD-10-CM

## 2025-08-06 PROCEDURE — 97110 THERAPEUTIC EXERCISES: CPT

## 2025-08-06 PROCEDURE — 97112 NEUROMUSCULAR REEDUCATION: CPT

## 2025-08-12 ENCOUNTER — OFFICE VISIT (OUTPATIENT)
Dept: PHYSICAL THERAPY | Facility: MEDICAL CENTER | Age: 55
End: 2025-08-12
Payer: COMMERCIAL

## 2025-08-14 ENCOUNTER — OFFICE VISIT (OUTPATIENT)
Dept: PHYSICAL THERAPY | Facility: MEDICAL CENTER | Age: 55
End: 2025-08-14
Payer: COMMERCIAL

## 2025-08-19 ENCOUNTER — OFFICE VISIT (OUTPATIENT)
Dept: PHYSICAL THERAPY | Facility: MEDICAL CENTER | Age: 55
End: 2025-08-19
Payer: COMMERCIAL

## 2025-08-19 DIAGNOSIS — M54.41 ACUTE BILATERAL LOW BACK PAIN WITH RIGHT-SIDED SCIATICA: Primary | ICD-10-CM

## 2025-08-19 DIAGNOSIS — M54.16 LUMBAR RADICULOPATHY: ICD-10-CM

## 2025-08-19 PROCEDURE — 97112 NEUROMUSCULAR REEDUCATION: CPT

## 2025-08-19 PROCEDURE — 97110 THERAPEUTIC EXERCISES: CPT

## 2025-08-20 ENCOUNTER — VBI (OUTPATIENT)
Dept: ADMINISTRATIVE | Facility: OTHER | Age: 55
End: 2025-08-20

## 2025-08-21 ENCOUNTER — OFFICE VISIT (OUTPATIENT)
Dept: PHYSICAL THERAPY | Facility: MEDICAL CENTER | Age: 55
End: 2025-08-21
Payer: COMMERCIAL

## 2025-08-21 DIAGNOSIS — M54.16 LUMBAR RADICULOPATHY: ICD-10-CM

## 2025-08-21 DIAGNOSIS — M54.41 ACUTE BILATERAL LOW BACK PAIN WITH RIGHT-SIDED SCIATICA: Primary | ICD-10-CM

## 2025-08-21 PROCEDURE — 97110 THERAPEUTIC EXERCISES: CPT

## 2025-08-21 PROCEDURE — 97112 NEUROMUSCULAR REEDUCATION: CPT
